# Patient Record
Sex: FEMALE | Race: WHITE | NOT HISPANIC OR LATINO | Employment: FULL TIME | URBAN - NONMETROPOLITAN AREA
[De-identification: names, ages, dates, MRNs, and addresses within clinical notes are randomized per-mention and may not be internally consistent; named-entity substitution may affect disease eponyms.]

---

## 2023-03-07 ENCOUNTER — OFFICE VISIT (OUTPATIENT)
Dept: CARDIOLOGY | Facility: CLINIC | Age: 33
End: 2023-03-07
Payer: MEDICAID

## 2023-03-07 ENCOUNTER — TELEPHONE (OUTPATIENT)
Dept: CARDIOLOGY | Facility: CLINIC | Age: 33
End: 2023-03-07
Payer: MEDICAID

## 2023-03-07 VITALS
HEART RATE: 91 BPM | DIASTOLIC BLOOD PRESSURE: 68 MMHG | HEIGHT: 64 IN | OXYGEN SATURATION: 98 % | SYSTOLIC BLOOD PRESSURE: 101 MMHG | WEIGHT: 142 LBS | BODY MASS INDEX: 24.24 KG/M2

## 2023-03-07 DIAGNOSIS — Z98.890 HISTORY OF LOOP RECORDER: Primary | ICD-10-CM

## 2023-03-07 DIAGNOSIS — R00.0 TACHYCARDIA: ICD-10-CM

## 2023-03-07 DIAGNOSIS — R00.2 PALPITATIONS: ICD-10-CM

## 2023-03-07 PROCEDURE — 1160F RVW MEDS BY RX/DR IN RCRD: CPT | Performed by: NURSE PRACTITIONER

## 2023-03-07 PROCEDURE — 1159F MED LIST DOCD IN RCRD: CPT | Performed by: NURSE PRACTITIONER

## 2023-03-07 PROCEDURE — 99204 OFFICE O/P NEW MOD 45 MIN: CPT | Performed by: NURSE PRACTITIONER

## 2023-03-07 RX ORDER — CETIRIZINE HYDROCHLORIDE 10 MG/1
10 TABLET ORAL DAILY
COMMUNITY

## 2023-03-07 RX ORDER — NEOMYCIN SULFATE, POLYMYXIN B SULFATE, AND DEXAMETHASONE 3.5; 10000; 1 MG/G; [USP'U]/G; MG/G
OINTMENT OPHTHALMIC 4 TIMES DAILY
COMMUNITY
Start: 2023-03-06

## 2023-03-07 RX ORDER — AZITHROMYCIN 250 MG/1
250 TABLET, FILM COATED ORAL DAILY
COMMUNITY
Start: 2023-03-06

## 2023-03-07 RX ORDER — CLONIDINE HYDROCHLORIDE 0.1 MG/1
0.1 TABLET ORAL 2 TIMES DAILY
COMMUNITY

## 2023-03-07 RX ORDER — DESVENLAFAXINE SUCCINATE 50 MG/1
50 TABLET, EXTENDED RELEASE ORAL DAILY
COMMUNITY

## 2023-03-07 NOTE — PROGRESS NOTES
Subjective     Ángela Turpin is a 33 y.o. female who presents to day for surgery clearance (Has a loop recorder has not see cardiologist since implant has had implant 2 yrs).    CHIEF COMPLIANT  Chief Complaint   Patient presents with   • surgery clearance     Has a loop recorder has not see cardiologist since implant has had implant 2 yrs       Active Problems:  Problem List Items Addressed This Visit    None  Visit Diagnoses     History of loop recorder    -  Primary    Palpitations        Tachycardia              HPI  HPI     ÁNGELA TURPIN is a 33-year-old female patient being seen today as a new patient for cardiac evaluation and risk stratification for surgery. She does have a loop recorder that has been implanted in the past. The patient's heart rate is 91 bpm and her blood pressure is 101/68 mmHg. EKG performed on 03/07/2023 showed incomplete right bundle branch block. The patient had an appendectomy in 12/2022.vy presents today for an initial visit to establish care and obtain cardiac evaluation and clearance for surgery.     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 The patient states she was supposed to wear a Holter monitor for 10 days, but day 5 she believes the stickers were burning her skin and notes Dr. Ayoub advised her if this was going to burn her skin, the next step would be to do a loop recorder.    She reports Dr. Renea Carreon's office called her and informed her she needed to be cleared for surgery. She states Dr. Ayoub has known about her having the loop recorder for 3 months and adds she was aggravated and decided to find a new cardiologist here. The patient states Dr. Ayoub informed her she would not have to do anything with the [facility] getting the readings. She states if there were any issues, they would contact her. The patient states she has not heard back from them  since. She states this is almost 3 years since 2020.    She reports she has had 2 MRIs since 2022 and states in 2022, she fractured her foot and had to have an MRI of her cervical spine and notes she may have had an MRI of her abdomen in 2022 due to appendicitis.  These MRIs occurred while having the loop recorder in place.  The patient reports she is able to walk up a flight of stairs every day, is able to do what she wants without any limitations and denies angina or dyspnea. She maintains she has 3 children and they keep her active.   She notes she does not have any cardiac history other than tachycardia, which seems to have resolved. The patient reports she has no family history of heart disease other than hypertension and hyperlipidemia and she has never smoked. The patient states her grandfather  of a myocardial infarction in his mid 60's and notes he did not have any issues prior to his myocardial infarction.    The patient states she has had issues with palpitations due to depression and anxiety but is not currently experiencing any.The patient states she has sleep apnea.    She denies chest pain, shortness of breath, lower extremity edema, palpitations, fatigue, dizziness, lightheadedness, syncope, PND, orthopnea, or strokelike symptoms.  PRIOR MEDS  Current Outpatient Medications on File Prior to Visit   Medication Sig Dispense Refill   • azithromycin (ZITHROMAX) 250 MG tablet Take 1 tablet by mouth Daily.     • cetirizine (zyrTEC) 10 MG tablet Take 1 tablet by mouth Daily.     • cloNIDine (CATAPRES) 0.1 MG tablet Take 1 tablet by mouth 2 (Two) Times a Day.     • desvenlafaxine (PRISTIQ) 50 MG 24 hr tablet Take 1 tablet by mouth Daily.     • neomycin-polymyxin-dexamethamethasone (POLYDEX) 3.5-54047-4.1 ointment ophthalmic ointment 4 (Four) Times a Day.       No current facility-administered medications on file prior to visit.       ALLERGIES  Vancomycin and Sulfa  "antibiotics    HISTORY  Past Medical History:   Diagnosis Date   • Herniated disc, cervical    • Spinal column deformity    • Tachycardia        Social History     Socioeconomic History   • Marital status:    Tobacco Use   • Smoking status: Never   • Smokeless tobacco: Never   Vaping Use   • Vaping Use: Never used   Substance and Sexual Activity   • Alcohol use: Never   • Drug use: Never   • Sexual activity: Defer       Family History   Problem Relation Age of Onset   • Hypertension Mother    • Diabetes Mother    • Cancer Mother    • Other Father         hypoglycemia   • Hyperlipidemia Father    • Hypertension Father    • Depression Brother    • Anxiety disorder Brother    • Thyroid disease Paternal Grandmother        Review of Systems   Constitutional: Negative for chills, fatigue and fever.   HENT: Negative for congestion, rhinorrhea and sore throat.    Eyes: Positive for visual disturbance (has had 24 surgeries on Left eye).   Respiratory: Positive for apnea (C PAP). Negative for chest tightness and shortness of breath.    Cardiovascular: Negative for chest pain, palpitations and leg swelling.   Gastrointestinal: Negative for constipation, diarrhea and nausea.   Musculoskeletal: Positive for back pain and neck pain. Negative for arthralgias.   Allergic/Immunologic: Positive for environmental allergies. Negative for food allergies.   Neurological: Negative for dizziness, syncope, weakness and light-headedness.   Hematological: Does not bruise/bleed easily.   Psychiatric/Behavioral: Negative for sleep disturbance.       Objective     VITALS: /68 (BP Location: Left arm, Patient Position: Sitting, Cuff Size: Adult)   Pulse 91   Ht 162.6 cm (64\")   Wt 64.4 kg (142 lb)   SpO2 98%   BMI 24.37 kg/m²     LABS:   Lab Results (most recent)     None          IMAGING:   No Images in the past 120 days found..    EXAM:  Physical Exam  Vitals and nursing note reviewed.   Constitutional:       Appearance: She " is well-developed.   HENT:      Head: Normocephalic.   Eyes:      Pupils: Pupils are equal, round, and reactive to light.   Neck:      Thyroid: No thyroid mass.      Vascular: No carotid bruit or JVD.      Trachea: Trachea and phonation normal.   Cardiovascular:      Rate and Rhythm: Normal rate and regular rhythm.      Pulses:           Radial pulses are 2+ on the right side and 2+ on the left side.        Posterior tibial pulses are 2+ on the right side and 2+ on the left side.      Heart sounds: Normal heart sounds. No murmur heard.    No friction rub. No gallop.   Pulmonary:      Effort: Pulmonary effort is normal. No respiratory distress.      Breath sounds: Normal breath sounds. No wheezing or rales.   Abdominal:      General: Bowel sounds are normal.      Palpations: Abdomen is soft.   Musculoskeletal:         General: No swelling. Normal range of motion.      Cervical back: Neck supple.   Skin:     General: Skin is warm and dry.      Capillary Refill: Capillary refill takes less than 2 seconds.      Findings: No rash.   Neurological:      Mental Status: She is alert and oriented to person, place, and time.   Psychiatric:         Speech: Speech normal.         Behavior: Behavior normal.         Thought Content: Thought content normal.         Judgment: Judgment normal.         Procedure     ECG 12 Lead    Date/Time: 3/10/2023 2:43 PM  Performed by: Werner Antony APRN  Authorized by: Werner Antony APRN   Comparison: not compared with previous ECG   Previous ECG: no previous ECG available  Rhythm: sinus rhythm  Rate: normal  BPM: 88  Conduction: incomplete right bundle branch block  QRS axis: normal  Comments: QTc 442 ms  No acute changes               Assessment & Plan           Diagnosis Plan   1. History of loop recorder        2. Palpitations        3. Tachycardia          1. The patient's EKG results were discussed in full detail with the patient.  She denies any angina anginal equivalent  symptoms.  2. The patient was advised to have her cervical spine surgery and have her loop recorder removed afterwards.    3. The patient's cardiac evaluation and surgical clearance will be faxed to Dr. Ayoub.  I do feel like patient would be at an acceptable risk due to the lack of anginal anginal equivalent symptoms.  She has no comorbidities that would put her at risk of heart disease or strong family history.  She seems to be a healthy 33-year-old female other than her cervical spine issues that are requiring surgery.  She also reports a reasonable functional capacity with the exception of the limitations that are brought on due to her cervical disc issues.  4.  Informed of signs and symptoms of ACS and advised to seek emergent treatment for any new worsening symptoms.  Patient also advised sooner follow-up as needed.  Also advised to follow-up with family doctor as needed  This note is dictated utilizing voice recognition software.  Although this record has been proof read, transcriptional errors may still be present. If questions occur regarding the content of this record please do not hesitate to call our office.  I have reviewed and confirmed the accuracy of the ROS as documented by the MA/LPN/RN YOVANA Chavez    Assessment  1. Preoperative examination  2. Cervical radiculopathy  3. Tachycardia    Return in about 6 months (around 9/7/2023), or if symptoms worsen or fail to improve.    Diagnoses and all orders for this visit:    1. History of loop recorder (Primary)    2. Palpitations    3. Tachycardia    Other orders  -     Cancel: ECG 12 Lead  -     ECG 12 Lead        Mckayla Turpin  reports that she has never smoked. She has never used smokeless tobacco.. I have educated her on the risk of diseases from using tobacco products .       MEDS ORDERED DURING VISIT:  No orders of the defined types were placed in this encounter.          This document has been electronically signed by Werner Antony  , APRN  March 10, 2023 14:44 EST     Transcribed from ambient dictation for YOVANA Chavez by Santiago Betts.  03/07/23   17:33 EST    Patient or patient representative verbalized consent to the visit recording.  I have personally performed the services described in this document as transcribed by the above individual, and it is both accurate and complete.

## 2023-03-07 NOTE — TELEPHONE ENCOUNTER
Received cardiac clearance request from  stating pt has C4-5, C5-6 anterior cervical discectomy and fusion scheduled for 03/15/2023 and is requiring a cardiac clearance. Placed cardiac clearance request in Kiarra's inbox to review and address with provider.

## 2023-03-10 ENCOUNTER — PATIENT ROUNDING (BHMG ONLY) (OUTPATIENT)
Dept: CARDIOLOGY | Facility: CLINIC | Age: 33
End: 2023-03-10
Payer: MEDICAID

## 2023-03-10 PROCEDURE — 93000 ELECTROCARDIOGRAM COMPLETE: CPT | Performed by: NURSE PRACTITIONER

## 2023-03-10 NOTE — PROGRESS NOTES
March 10, 2023    Hello, may I speak with Mckayla Turpin?    My name is LU ZMARIA RILEY      I am  with [facility]  [address] [place] [id number] [phone].    Before we get started may I verify your date of birth? 1990    I am calling to officially welcome you to our practice and ask about your recent visit. Is this a good time to talk? no    NO ANSWER.  UNABLE TO LVM      Thank you, and have a great day.

## 2023-10-03 ENCOUNTER — TELEPHONE (OUTPATIENT)
Dept: CARDIOLOGY | Facility: CLINIC | Age: 33
End: 2023-10-03
Payer: MEDICAID

## 2023-10-04 ENCOUNTER — TELEPHONE (OUTPATIENT)
Dept: CARDIOLOGY | Facility: CLINIC | Age: 33
End: 2023-10-04
Payer: MEDICAID

## 2023-11-03 ENCOUNTER — TELEPHONE (OUTPATIENT)
Dept: CARDIOLOGY | Facility: CLINIC | Age: 33
End: 2023-11-03
Payer: MEDICAID

## 2023-11-03 NOTE — LETTER
November 3, 2023     To Whom It May Concern:     We build our practice on integrity and patient care. The highest compliment we can receive is the referral of friends, family and patients to our office. We want to take this time to thank you for considering our group as part of your care team.     The medical records for Mckayla Turpin 1990 were reviewed for pre-operative clearance on 10/03/23. It has been determined that this patient has:  Acceptable Risk      Mckayla Turpin is currently on the following medications that will need to be held prior to surgery: No medication to hold at this time.     This pre-operative evaluation has been completed based on patient reported medical conditions at the date of this letter, this evaluation may have considered in part results of additional testing, completion of an EKG and patient reported symptoms at the time of their visit.     Mckayla Turpin's pre-operative clearance is good for thirty(30) days from the date of this letter with no reported changes in health, symptoms or diagnosis from the patient, their primary care provider or your office.     Your office has reported the patient will under go C5-6 ACDF on 11/07/23 with Dr. Ayoub. If this appointment is changed or moved outside of thirty(30) days from 11/03/23 this pre-operative clearance is void.     If you have any further questions please give our office a call.     Thank you for your trust,        YOVANA Chavez

## 2023-11-03 NOTE — TELEPHONE ENCOUNTER
Received cardiac clearance request from  stating pt has C5-6 ACDF scheduled for 11/07/2023 and is requiring a cardiac clearance. Placed cardiac clearance request in Clemencia's inbox to review and address with provider.

## 2023-12-28 ENCOUNTER — TELEPHONE (OUTPATIENT)
Dept: CARDIOLOGY | Facility: CLINIC | Age: 33
End: 2023-12-28
Payer: MEDICAID

## 2023-12-28 NOTE — TELEPHONE ENCOUNTER
Received cardiac clearance request from  stating pt has C5-6 ACDF scheduled for 01/09/2024 and is requiring a cardiac clearance. Placed cardiac clearance request in Kiarra's inbox to review and address with provider.

## 2024-08-16 ENCOUNTER — APPOINTMENT (OUTPATIENT)
Dept: CT IMAGING | Facility: HOSPITAL | Age: 34
End: 2024-08-16
Payer: MEDICAID

## 2024-08-16 ENCOUNTER — HOSPITAL ENCOUNTER (EMERGENCY)
Facility: HOSPITAL | Age: 34
Discharge: HOME | End: 2024-08-16
Attending: EMERGENCY MEDICINE
Payer: MEDICAID

## 2024-08-16 VITALS
SYSTOLIC BLOOD PRESSURE: 113 MMHG | BODY MASS INDEX: 30.39 KG/M2 | HEART RATE: 74 BPM | TEMPERATURE: 98.2 F | RESPIRATION RATE: 12 BRPM | WEIGHT: 178 LBS | DIASTOLIC BLOOD PRESSURE: 74 MMHG | OXYGEN SATURATION: 99 % | HEIGHT: 64 IN

## 2024-08-16 DIAGNOSIS — N30.30 FOLLICULAR CYSTITIS: Primary | ICD-10-CM

## 2024-08-16 LAB
ALBUMIN SERPL-MCNC: 4.3 G/DL (ref 3.5–5.2)
ALBUMIN/GLOB SERPL: 1.4 G/DL
ALP SERPL-CCNC: 68 U/L (ref 39–117)
ALT SERPL W P-5'-P-CCNC: 12 U/L (ref 1–33)
ANION GAP SERPL CALCULATED.3IONS-SCNC: 6.8 MMOL/L (ref 5–15)
AST SERPL-CCNC: 16 U/L (ref 1–32)
B-HCG UR QL: NEGATIVE
BASOPHILS # BLD AUTO: 0.06 10*3/MM3 (ref 0–0.2)
BASOPHILS NFR BLD AUTO: 0.7 % (ref 0–1.5)
BILIRUB SERPL-MCNC: 0.2 MG/DL (ref 0–1.2)
BILIRUB UR QL STRIP: NEGATIVE
BUN SERPL-MCNC: 7 MG/DL (ref 6–20)
BUN/CREAT SERPL: 10.4 (ref 7–25)
CALCIUM SPEC-SCNC: 9.2 MG/DL (ref 8.6–10.5)
CHLORIDE SERPL-SCNC: 102 MMOL/L (ref 98–107)
CLARITY UR: CLEAR
CO2 SERPL-SCNC: 28.2 MMOL/L (ref 22–29)
COLOR UR: YELLOW
CREAT SERPL-MCNC: 0.67 MG/DL (ref 0.57–1)
DEPRECATED RDW RBC AUTO: 39.3 FL (ref 37–54)
EGFRCR SERPLBLD CKD-EPI 2021: 117.1 ML/MIN/1.73
EOSINOPHIL # BLD AUTO: 0.48 10*3/MM3 (ref 0–0.4)
EOSINOPHIL NFR BLD AUTO: 5.5 % (ref 0.3–6.2)
ERYTHROCYTE [DISTWIDTH] IN BLOOD BY AUTOMATED COUNT: 11.9 % (ref 12.3–15.4)
GLOBULIN UR ELPH-MCNC: 3.1 GM/DL
GLUCOSE SERPL-MCNC: 99 MG/DL (ref 65–99)
GLUCOSE UR STRIP-MCNC: NEGATIVE MG/DL
HCT VFR BLD AUTO: 36.4 % (ref 34–46.6)
HGB BLD-MCNC: 12.5 G/DL (ref 12–15.9)
HGB UR QL STRIP.AUTO: NEGATIVE
IMM GRANULOCYTES # BLD AUTO: 0.02 10*3/MM3 (ref 0–0.05)
IMM GRANULOCYTES NFR BLD AUTO: 0.2 % (ref 0–0.5)
KETONES UR QL STRIP: NEGATIVE
LEUKOCYTE ESTERASE UR QL STRIP.AUTO: NEGATIVE
LIPASE SERPL-CCNC: 40 U/L (ref 13–60)
LYMPHOCYTES # BLD AUTO: 2.03 10*3/MM3 (ref 0.7–3.1)
LYMPHOCYTES NFR BLD AUTO: 23.3 % (ref 19.6–45.3)
MCH RBC QN AUTO: 31 PG (ref 26.6–33)
MCHC RBC AUTO-ENTMCNC: 34.3 G/DL (ref 31.5–35.7)
MCV RBC AUTO: 90.3 FL (ref 79–97)
MONOCYTES # BLD AUTO: 0.92 10*3/MM3 (ref 0.1–0.9)
MONOCYTES NFR BLD AUTO: 10.5 % (ref 5–12)
NEUTROPHILS NFR BLD AUTO: 5.22 10*3/MM3 (ref 1.7–7)
NEUTROPHILS NFR BLD AUTO: 59.8 % (ref 42.7–76)
NITRITE UR QL STRIP: NEGATIVE
NRBC BLD AUTO-RTO: 0 /100 WBC (ref 0–0.2)
PH UR STRIP.AUTO: 7.5 [PH] (ref 5–8)
PLATELET # BLD AUTO: 227 10*3/MM3 (ref 140–450)
PMV BLD AUTO: 9.5 FL (ref 6–12)
POTASSIUM SERPL-SCNC: 3.7 MMOL/L (ref 3.5–5.2)
PROT SERPL-MCNC: 7.4 G/DL (ref 6–8.5)
PROT UR QL STRIP: NEGATIVE
RBC # BLD AUTO: 4.03 10*6/MM3 (ref 3.77–5.28)
SODIUM SERPL-SCNC: 137 MMOL/L (ref 136–145)
SP GR UR STRIP: 1.01 (ref 1–1.03)
UROBILINOGEN UR QL STRIP: NORMAL
WBC NRBC COR # BLD AUTO: 8.73 10*3/MM3 (ref 3.4–10.8)

## 2024-08-16 PROCEDURE — 96374 THER/PROPH/DIAG INJ IV PUSH: CPT

## 2024-08-16 PROCEDURE — 81003 URINALYSIS AUTO W/O SCOPE: CPT | Performed by: NURSE PRACTITIONER

## 2024-08-16 PROCEDURE — 25010000002 MORPHINE PER 10 MG: Performed by: EMERGENCY MEDICINE

## 2024-08-16 PROCEDURE — 99285 EMERGENCY DEPT VISIT HI MDM: CPT

## 2024-08-16 PROCEDURE — 36415 COLL VENOUS BLD VENIPUNCTURE: CPT

## 2024-08-16 PROCEDURE — 85025 COMPLETE CBC W/AUTO DIFF WBC: CPT | Performed by: NURSE PRACTITIONER

## 2024-08-16 PROCEDURE — 25510000001 IOPAMIDOL 61 % SOLUTION: Performed by: EMERGENCY MEDICINE

## 2024-08-16 PROCEDURE — 96375 TX/PRO/DX INJ NEW DRUG ADDON: CPT

## 2024-08-16 PROCEDURE — 81025 URINE PREGNANCY TEST: CPT | Performed by: NURSE PRACTITIONER

## 2024-08-16 PROCEDURE — 83690 ASSAY OF LIPASE: CPT | Performed by: NURSE PRACTITIONER

## 2024-08-16 PROCEDURE — 25010000002 ONDANSETRON PER 1 MG: Performed by: EMERGENCY MEDICINE

## 2024-08-16 PROCEDURE — 80053 COMPREHEN METABOLIC PANEL: CPT | Performed by: NURSE PRACTITIONER

## 2024-08-16 PROCEDURE — 74177 CT ABD & PELVIS W/CONTRAST: CPT

## 2024-08-16 RX ORDER — HYDROCODONE BITARTRATE AND ACETAMINOPHEN 5; 325 MG/1; MG/1
1 TABLET ORAL EVERY 6 HOURS PRN
Qty: 3 TABLET | Refills: 0 | Status: SHIPPED | OUTPATIENT
Start: 2024-08-16 | End: 2024-08-17

## 2024-08-16 RX ORDER — ONDANSETRON 2 MG/ML
4 INJECTION INTRAMUSCULAR; INTRAVENOUS ONCE
Status: COMPLETED | OUTPATIENT
Start: 2024-08-16 | End: 2024-08-16

## 2024-08-16 RX ORDER — HYDROCODONE BITARTRATE AND ACETAMINOPHEN 5; 325 MG/1; MG/1
1 TABLET ORAL ONCE
Status: COMPLETED | OUTPATIENT
Start: 2024-08-16 | End: 2024-08-16

## 2024-08-16 RX ADMIN — IOPAMIDOL 100 ML: 612 INJECTION, SOLUTION INTRAVENOUS at 19:51

## 2024-08-16 RX ADMIN — HYDROCODONE BITARTRATE AND ACETAMINOPHEN 1 TABLET: 5; 325 TABLET ORAL at 21:57

## 2024-08-16 RX ADMIN — MORPHINE SULFATE 4 MG: 4 INJECTION, SOLUTION INTRAMUSCULAR; INTRAVENOUS at 20:59

## 2024-08-16 RX ADMIN — ONDANSETRON 4 MG: 2 INJECTION INTRAMUSCULAR; INTRAVENOUS at 20:59

## 2024-08-16 NOTE — Clinical Note
[facility] EMERGENCY DEPARTMENT  [address] [address]  Phone: [phone]    Mckayla Turpin was seen and treated in our emergency department on 8/16/2024.  She may return to work on 08/19/2024.         Thank you for choosing [facility].    Baltazar Hernandez, APRN

## 2024-08-17 NOTE — ED PROVIDER NOTES
Subjective:  History of Present Illness:    Patient is a 34-year-old female with history of cholecystectomy, appendectomy endometrial ablation and tubal.  Presents to the ER today for abdominal pain x 2 days.  Reports that abdominal pain is in the suprapubic region and midline.  She reports that she has been nauseous but has not had nausea or vomiting.  She denies changes in bowel habit.  Denies dysuria frequency or hematuria.  Denies OTC medication or home remedy.  Nuys leaving exacerbating factors.    Nurses Notes reviewed and agree, including vitals, allergies, social history and prior medical history.     REVIEW OF SYSTEMS: All systems reviewed and not pertinent unless noted.  Review of Systems   Gastrointestinal:  Positive for abdominal pain.   All other systems reviewed and are negative.      Past Medical History:   Diagnosis Date    Herniated disc, cervical     Spinal column deformity     Tachycardia        Allergies:    Vancomycin and Sulfa antibiotics      Past Surgical History:   Procedure Laterality Date    APPENDECTOMY  12/2022    CHOLECYSTECTOMY  2017    COLONOSCOPY      ENDOMETRIAL ABLATION      EYE SURGERY Left     x 24    TONSILECTOMY, ADENOIDECTOMY, BILATERAL MYRINGOTOMY AND TUBES      TUBAL ABDOMINAL LIGATION  2014         Social History     Socioeconomic History    Marital status:    Tobacco Use    Smoking status: Never    Smokeless tobacco: Never   Vaping Use    Vaping status: Never Used   Substance and Sexual Activity    Alcohol use: Never    Drug use: Never    Sexual activity: Defer         Family History   Problem Relation Age of Onset    Hypertension Mother     Diabetes Mother     Cancer Mother     Other Father         hypoglycemia    Hyperlipidemia Father     Hypertension Father     Depression Brother     Anxiety disorder Brother     Thyroid disease Paternal Grandmother        Objective  Physical Exam:  /74   Pulse 74   Temp 98.2 °F (36.8 °C) (Oral)   Resp 12   Ht 162.6 cm  "(64\")   Wt 80.7 kg (178 lb)   SpO2 99%   BMI 30.55 kg/m²      Physical Exam  Vitals and nursing note reviewed.   Constitutional:       Appearance: She is well-developed and normal weight.   HENT:      Head: Normocephalic and atraumatic.      Mouth/Throat:      Mouth: Mucous membranes are moist.      Pharynx: Oropharynx is clear.   Eyes:      Extraocular Movements: Extraocular movements intact.      Pupils: Pupils are equal, round, and reactive to light.   Cardiovascular:      Rate and Rhythm: Normal rate and regular rhythm.      Heart sounds: Normal heart sounds.   Pulmonary:      Effort: Pulmonary effort is normal.      Breath sounds: Normal breath sounds.   Abdominal:      General: Bowel sounds are normal.      Palpations: Abdomen is soft.      Tenderness:  in the suprapubic area   Skin:     General: Skin is warm and dry.      Capillary Refill: Capillary refill takes less than 2 seconds.   Neurological:      General: No focal deficit present.      Mental Status: She is alert and oriented to person, place, and time.   Psychiatric:         Mood and Affect: Mood normal.         Behavior: Behavior normal.         Procedures    ED Course:    ED Course as of 08/16/24 2155   Fri Aug 16, 2024   2135 Patient generalized abdominal pain particularly suprapubic.  Patient with grossly negative labs.  Pending CT imaging evaluation.  If CT is negative do feel patient will be stable for discharge. [CR]      ED Course User Index  [CR] Ozzie Guzmán DO       Lab Results (last 24 hours)       Procedure Component Value Units Date/Time    CBC Auto Differential [[id number]]  (Abnormal) Collected: 08/16/24 1900    Specimen: Blood Updated: 08/16/24 1907     WBC 8.73 10*3/mm3      RBC 4.03 10*6/mm3      Hemoglobin 12.5 g/dL      Hematocrit 36.4 %      MCV 90.3 fL      MCH 31.0 pg      MCHC 34.3 g/dL      RDW 11.9 %      RDW-SD 39.3 fl      MPV 9.5 fL      Platelets 227 10*3/mm3      Neutrophil % 59.8 %      Lymphocyte % " 23.3 %      Monocyte % 10.5 %      Eosinophil % 5.5 %      Basophil % 0.7 %      Immature Grans % 0.2 %      Neutrophils, Absolute 5.22 10*3/mm3      Lymphocytes, Absolute 2.03 10*3/mm3      Monocytes, Absolute 0.92 10*3/mm3      Eosinophils, Absolute 0.48 10*3/mm3      Basophils, Absolute 0.06 10*3/mm3      Immature Grans, Absolute 0.02 10*3/mm3      nRBC 0.0 /100 WBC     Comprehensive Metabolic Panel [[id number]] Collected: 08/16/24 1900    Specimen: Blood Updated: 08/16/24 1950     Glucose 99 mg/dL      BUN 7 mg/dL      Creatinine 0.67 mg/dL      Sodium 137 mmol/L      Potassium 3.7 mmol/L      Chloride 102 mmol/L      CO2 28.2 mmol/L      Calcium 9.2 mg/dL      Total Protein 7.4 g/dL      Albumin 4.3 g/dL      ALT (SGPT) 12 U/L      AST (SGOT) 16 U/L      Alkaline Phosphatase 68 U/L      Total Bilirubin 0.2 mg/dL      Globulin 3.1 gm/dL      A/G Ratio 1.4 g/dL      BUN/Creatinine Ratio 10.4     Anion Gap 6.8 mmol/L      eGFR 117.1 mL/min/1.73     Narrative:      GFR Normal >60  Chronic Kidney Disease <60  Kidney Failure <15      Lipase [[id number]]  (Normal) Collected: 08/16/24 1900    Specimen: Blood Updated: 08/16/24 1950     Lipase 40 U/L     Pregnancy, Urine - Urine, Clean Catch [[id number]]  (Normal) Collected: 08/16/24 1915    Specimen: Urine, Clean Catch Updated: 08/16/24 1922     HCG, Urine QL Negative    Urinalysis With Culture If Indicated - Urine, Clean Catch [[id number]]  (Normal) Collected: 08/16/24 1915    Specimen: Urine, Clean Catch Updated: 08/16/24 2033     Color, UA Yellow     Appearance, UA Clear     pH, UA 7.5     Specific Gravity, UA 1.011     Glucose, UA Negative     Ketones, UA Negative     Bilirubin, UA Negative     Blood, UA Negative     Protein, UA Negative     Leuk Esterase, UA Negative     Nitrite, UA Negative     Urobilinogen, UA 1.0 E.U./dL    Narrative:      In absence of clinical symptoms, the presence of pyuria, bacteria, and/or nitrites on the urinalysis result does not correlate  with infection.  Urine microscopic not indicated.             CT Abdomen Pelvis With Contrast    Result Date: 8/16/2024  FINAL REPORT TECHNIQUE: null CLINICAL HISTORY: Lower abdominal pain COMPARISON: null FINDINGS: CT abdomen and pelvis with contrast Comparison: None Findings: The lung bases are clear. A small umbilical hernia which contains The abdominal aorta is unremarkable. The liver is unremarkable. Cholecystectomy. The pancreas, spleen, and adrenal glands are all unremarkable. Tiny cortical cysts of the left kidney. No nephrolithiasis or hydronephrosis. The contour of the urinary bladder is unremarkable. Anteverted uterus. Trace free fluid in the cul-de-sac and within the right adnexa which is likely physiologic or reactive. A small follicular cyst of the right adnexa measures 1.0 cm. Mild intraluminal fluid within the small bowel No bowel obstruction, pneumoperitoneum, or pneumatosis. Mild colonic fecal burden. Appendectomy. The bones are intact.     Impression: IMPRESSION: 1. Trace free fluid in the cul-de-sac and within the right adnexa which is likely physiologic or reactive. A small follicular cyst of the right adnexa measures 1.0 cm. 2. Cholecystectomy and appendectomy. Authenticated and Electronically Signed by Tahir Meng DO on 08/16/2024 09:40:06 PM        MDM      Initial impression of presenting illness:   Patient is a 34-year-old female with history of cholecystectomy, appendectomy endometrial ablation and tubal.  Presents to the ER today for abdominal pain x 2 days.  Reports that abdominal pain is in the suprapubic region and midline.  She reports that she has been nauseous but has not had nausea or vomiting.  She denies changes in bowel habit.  Denies dysuria frequency or hematuria.  Denies OTC medication or home remedy.  Nuys leaving exacerbating factors.    DDX: includes but is not limited to: Ovarian cyst, acute cystitis, UTI or other    Patient arrives stable with vitals interpreted by  myself.     Pertinent features from physical exam: Lung sounds are clear bilaterally throughout.  Abdomen is soft.  There is tenderness midline suprapubic region..    Initial diagnostic plan: CBC, CMP, lipase, urinalysis, urine pregnancy, CT abdomen pelvis    Results from initial plan were reviewed and interpreted by me revealing CBC, CMP, lipase, urinalysis, urine Prag were all negative.  CT abdomen pelvis with the following impression trace free fluid in the cul-de-sac and within the right adnexa which is actually physiologic or reactive.  A small follicular cyst of the right adnexa measures 1.0 cm    Diagnostic information from other sources: Chart review    Interventions / Re-evaluation: Signs stable throughout encounter    Results/clinical rationale were discussed with patient    Consultations/Discussion of results with other physicians: N/A    Disposition plan: Patient is hemodynamic stable nontoxic-appearing appropriate discharge.  Will have patient follow-up with OB/GYN.  Follow-up ER for any worse symptoms.  -----        Final diagnoses:   Follicular cystitis          Baltazar Hernandez, APRN  08/16/24 [id number]

## 2024-08-19 ENCOUNTER — APPOINTMENT (OUTPATIENT)
Dept: ULTRASOUND IMAGING | Facility: HOSPITAL | Age: 34
End: 2024-08-19
Payer: MEDICAID

## 2024-08-19 ENCOUNTER — HOSPITAL ENCOUNTER (EMERGENCY)
Facility: HOSPITAL | Age: 34
Discharge: HOME | End: 2024-08-19
Attending: EMERGENCY MEDICINE | Admitting: EMERGENCY MEDICINE
Payer: MEDICAID

## 2024-08-19 VITALS
SYSTOLIC BLOOD PRESSURE: 117 MMHG | WEIGHT: 180 LBS | HEIGHT: 64 IN | DIASTOLIC BLOOD PRESSURE: 73 MMHG | RESPIRATION RATE: 20 BRPM | BODY MASS INDEX: 30.73 KG/M2 | HEART RATE: 81 BPM | OXYGEN SATURATION: 99 % | TEMPERATURE: 98.3 F

## 2024-08-19 DIAGNOSIS — R10.30 LOWER ABDOMINAL PAIN: Primary | ICD-10-CM

## 2024-08-19 LAB
ALBUMIN SERPL-MCNC: 4.3 G/DL (ref 3.5–5.2)
ALBUMIN/GLOB SERPL: 1.4 G/DL
ALP SERPL-CCNC: 73 U/L (ref 39–117)
ALT SERPL W P-5'-P-CCNC: 15 U/L (ref 1–33)
ANION GAP SERPL CALCULATED.3IONS-SCNC: 7.9 MMOL/L (ref 5–15)
AST SERPL-CCNC: 20 U/L (ref 1–32)
BASOPHILS # BLD AUTO: 0.05 10*3/MM3 (ref 0–0.2)
BASOPHILS NFR BLD AUTO: 0.7 % (ref 0–1.5)
BILIRUB SERPL-MCNC: 0.3 MG/DL (ref 0–1.2)
BILIRUB UR QL STRIP: NEGATIVE
BUN SERPL-MCNC: 5 MG/DL (ref 6–20)
BUN/CREAT SERPL: 8.9 (ref 7–25)
CALCIUM SPEC-SCNC: 8.8 MG/DL (ref 8.6–10.5)
CHLORIDE SERPL-SCNC: 104 MMOL/L (ref 98–107)
CLARITY UR: CLEAR
CO2 SERPL-SCNC: 26.1 MMOL/L (ref 22–29)
COLOR UR: YELLOW
CREAT SERPL-MCNC: 0.56 MG/DL (ref 0.57–1)
DEPRECATED RDW RBC AUTO: 40.9 FL (ref 37–54)
EGFRCR SERPLBLD CKD-EPI 2021: 122.2 ML/MIN/1.73
EOSINOPHIL # BLD AUTO: 0.53 10*3/MM3 (ref 0–0.4)
EOSINOPHIL NFR BLD AUTO: 7.1 % (ref 0.3–6.2)
ERYTHROCYTE [DISTWIDTH] IN BLOOD BY AUTOMATED COUNT: 12 % (ref 12.3–15.4)
GLOBULIN UR ELPH-MCNC: 3 GM/DL
GLUCOSE SERPL-MCNC: 94 MG/DL (ref 65–99)
GLUCOSE UR STRIP-MCNC: NEGATIVE MG/DL
HCT VFR BLD AUTO: 38.4 % (ref 34–46.6)
HGB BLD-MCNC: 12.9 G/DL (ref 12–15.9)
HGB UR QL STRIP.AUTO: NEGATIVE
HOLD SPECIMEN: NORMAL
HOLD SPECIMEN: NORMAL
IMM GRANULOCYTES # BLD AUTO: 0.01 10*3/MM3 (ref 0–0.05)
IMM GRANULOCYTES NFR BLD AUTO: 0.1 % (ref 0–0.5)
KETONES UR QL STRIP: NEGATIVE
LEUKOCYTE ESTERASE UR QL STRIP.AUTO: NEGATIVE
LIPASE SERPL-CCNC: 33 U/L (ref 13–60)
LYMPHOCYTES # BLD AUTO: 1.78 10*3/MM3 (ref 0.7–3.1)
LYMPHOCYTES NFR BLD AUTO: 23.9 % (ref 19.6–45.3)
MCH RBC QN AUTO: 31.2 PG (ref 26.6–33)
MCHC RBC AUTO-ENTMCNC: 33.6 G/DL (ref 31.5–35.7)
MCV RBC AUTO: 92.8 FL (ref 79–97)
MONOCYTES # BLD AUTO: 0.73 10*3/MM3 (ref 0.1–0.9)
MONOCYTES NFR BLD AUTO: 9.8 % (ref 5–12)
NEUTROPHILS NFR BLD AUTO: 4.36 10*3/MM3 (ref 1.7–7)
NEUTROPHILS NFR BLD AUTO: 58.4 % (ref 42.7–76)
NITRITE UR QL STRIP: NEGATIVE
NRBC BLD AUTO-RTO: 0 /100 WBC (ref 0–0.2)
PH UR STRIP.AUTO: 6 [PH] (ref 5–8)
PLATELET # BLD AUTO: 260 10*3/MM3 (ref 140–450)
PMV BLD AUTO: 9.9 FL (ref 6–12)
POTASSIUM SERPL-SCNC: 4 MMOL/L (ref 3.5–5.2)
PROT SERPL-MCNC: 7.3 G/DL (ref 6–8.5)
PROT UR QL STRIP: NEGATIVE
RBC # BLD AUTO: 4.14 10*6/MM3 (ref 3.77–5.28)
SODIUM SERPL-SCNC: 138 MMOL/L (ref 136–145)
SP GR UR STRIP: <=1.005 (ref 1–1.03)
UROBILINOGEN UR QL STRIP: NORMAL
WBC NRBC COR # BLD AUTO: 7.46 10*3/MM3 (ref 3.4–10.8)
WHOLE BLOOD HOLD COAG: NORMAL
WHOLE BLOOD HOLD SPECIMEN: NORMAL

## 2024-08-19 PROCEDURE — 96372 THER/PROPH/DIAG INJ SC/IM: CPT

## 2024-08-19 PROCEDURE — 80053 COMPREHEN METABOLIC PANEL: CPT | Performed by: EMERGENCY MEDICINE

## 2024-08-19 PROCEDURE — 85025 COMPLETE CBC W/AUTO DIFF WBC: CPT | Performed by: EMERGENCY MEDICINE

## 2024-08-19 PROCEDURE — 99284 EMERGENCY DEPT VISIT MOD MDM: CPT

## 2024-08-19 PROCEDURE — 76830 TRANSVAGINAL US NON-OB: CPT

## 2024-08-19 PROCEDURE — 25010000002 DICYCLOMINE PER 20 MG: Performed by: EMERGENCY MEDICINE

## 2024-08-19 PROCEDURE — 81003 URINALYSIS AUTO W/O SCOPE: CPT | Performed by: EMERGENCY MEDICINE

## 2024-08-19 PROCEDURE — 83690 ASSAY OF LIPASE: CPT | Performed by: EMERGENCY MEDICINE

## 2024-08-19 PROCEDURE — 25010000002 MORPHINE PER 10 MG: Performed by: EMERGENCY MEDICINE

## 2024-08-19 PROCEDURE — 36415 COLL VENOUS BLD VENIPUNCTURE: CPT

## 2024-08-19 PROCEDURE — 25810000003 SODIUM CHLORIDE 0.9 % SOLUTION: Performed by: EMERGENCY MEDICINE

## 2024-08-19 PROCEDURE — 96374 THER/PROPH/DIAG INJ IV PUSH: CPT

## 2024-08-19 RX ORDER — DICYCLOMINE HYDROCHLORIDE 10 MG/ML
20 INJECTION INTRAMUSCULAR ONCE
Status: COMPLETED | OUTPATIENT
Start: 2024-08-19 | End: 2024-08-19

## 2024-08-19 RX ORDER — SODIUM CHLORIDE 0.9 % (FLUSH) 0.9 %
10 SYRINGE (ML) INJECTION AS NEEDED
Status: DISCONTINUED | OUTPATIENT
Start: 2024-08-19 | End: 2024-08-20 | Stop reason: HOSPADM

## 2024-08-19 RX ORDER — PREDNISONE 20 MG/1
20 TABLET ORAL DAILY
Qty: 3 TABLET | Refills: 0 | Status: SHIPPED | OUTPATIENT
Start: 2024-08-19 | End: 2024-08-22

## 2024-08-19 RX ORDER — DICYCLOMINE HYDROCHLORIDE 10 MG/1
10 CAPSULE ORAL 3 TIMES DAILY PRN
Qty: 20 CAPSULE | Refills: 0 | Status: SHIPPED | OUTPATIENT
Start: 2024-08-19

## 2024-08-19 RX ADMIN — SODIUM CHLORIDE 1000 ML: 9 INJECTION, SOLUTION INTRAVENOUS at 17:47

## 2024-08-19 RX ADMIN — DICYCLOMINE HYDROCHLORIDE 20 MG: 10 INJECTION, SOLUTION INTRAMUSCULAR at 17:47

## 2024-08-19 RX ADMIN — MORPHINE SULFATE 4 MG: 4 INJECTION, SOLUTION INTRAMUSCULAR; INTRAVENOUS at 21:05

## 2024-08-19 NOTE — ED PROVIDER NOTES
March 18, 2021    Vadim Teixeira   [address] [facility] [address]    EGD Instructions        Efrain Marquez MD  [phone]        COVID TEST INSTRUCTIONS   Date of COVID testing  Bring ID   Date: Friday, April 16, 2021  Time:  3:25pm   Location: [facility],[address] [facility], [facility]  [id number]    Once you leave the testing area, it's important that you go straight home and remain at home until the day of your procedure to reduce your risk of any new exposure. It is very important to avoid any potential exposure to COVID-19.  Please follow these  instructions until surgery or procedure has been completed.    ·  Do NOT travel out of home      ·  Immediately report any new symptoms or suspected/known exposures to COVID-19 cases to your surgeon office.    · Maintain physical distancing (at least 6 feet) at all times     · Wash hands frequently and thoroughly with soap and water (lather at least 20 seconds) or disinfect with alcohol-based hand  before eating, before touching face/mouth/eyes, after touching shared objects or high touch surfaces.     · Regularly clean cell phones, door handles, light switches, faucet and toilet handles, bathrooms, and kitchen surfaces using household disinfectant or hot soapy water.                                Date of Procedure: April 19, 2021  Arrive to register at: 9:30am  Location:  [facility] - Day Surgery  [address] [place] [address]  [phone]  Please enter through the main doors near the Physician's [place] and check in at Day Surgery Registration.       Pre-Admit :  [place] [phone]     Please read the entire instructions as soon as you receive them    Patient Checklist    7 Days prior to your procedure  · If possible, try to avoid non-steroidal anti-inflammatory drugs (ibuprofen, Advil, Motrin, etc.).   · If you are on Plavix, please follow the instructions discussed       [facility] EMERGENCY DEPARTMENT  Emergency Department Encounter  Emergency Medicine Physician Note     Pt Name:Mckayla Turpin  MRN: [id number]  Birthdate 1990  Date of evaluation: 8/19/2024  PCP:  Briana White APRN  Note Started: 5:07 PM EDT      CHIEF COMPLAINT       Chief Complaint   Patient presents with    Abdominal Pain       HISTORY OF PRESENT ILLNESS  (Location/Symptom, Timing/Onset, Context/Setting, Quality, Duration, Modifying Factors, Severity.)      Mckayla Turpin is a 34 y.o. female who presents with suprapubic abdominal pain.  Patient states that she has been going on for about a week.  Patient was evaluated on Friday, states due to weakness she is having no relief, patient states that she does have increased food aversion.  Patient denies any fevers or chills.  Patient denies any change in urination.  Patient does state that she has not had a stool since Friday.  Patient normally does not have constipation.    PAST MEDICAL / SURGICAL / SOCIAL / FAMILY HISTORY     Past Medical History:   Diagnosis Date    Herniated disc, cervical     Spinal column deformity     Tachycardia      No additional pertinent       Past Surgical History:   Procedure Laterality Date    APPENDECTOMY  12/2022    CHOLECYSTECTOMY  2017    COLONOSCOPY      ENDOMETRIAL ABLATION      EYE SURGERY Left     x 24    TONSILECTOMY, ADENOIDECTOMY, BILATERAL MYRINGOTOMY AND TUBES      TUBAL ABDOMINAL LIGATION  2014     No additional pertinent       Social History     Socioeconomic History    Marital status:    Tobacco Use    Smoking status: Never    Smokeless tobacco: Never   Vaping Use    Vaping status: Never Used   Substance and Sexual Activity    Alcohol use: Never    Drug use: Never    Sexual activity: Defer       Family History   Problem Relation Age of Onset    Hypertension Mother     Diabetes Mother     Cancer Mother     Other Father         hypoglycemia    Hyperlipidemia Father     Hypertension Father   "   Depression Brother     Anxiety disorder Brother     Thyroid disease Paternal Grandmother        Allergies:  Vancomycin and Sulfa antibiotics    Home Medications:  Prior to Admission medications    Medication Sig Start Date End Date Taking? Authorizing Provider   cetirizine (zyrTEC) 10 MG tablet Take 1 tablet by mouth Daily.    ProviderCandelaria MD   ciprofloxacin-dexAMETHasone (CIPRODEX) 0.3-0.1 % otic suspension Administer 4 drops into the left ear 2 (Two) Times a Day. 7/8/24   Karin Mooney APRN   cloNIDine (CATAPRES) 0.1 MG tablet Take 1 tablet by mouth 2 (Two) Times a Day.    ProviderCandelaria MD   desvenlafaxine (PRISTIQ) 50 MG 24 hr tablet Take 1 tablet by mouth Daily.    ProviderCandelaria MD         REVIEW OF SYSTEMS       Review of Systems   Constitutional:  Negative for diaphoresis and fever.   Respiratory:  Negative for chest tightness and shortness of breath.    Cardiovascular:  Negative for chest pain.   Gastrointestinal:  Positive for abdominal pain and constipation. Negative for nausea and vomiting.   Endocrine: Negative for polyuria.   Genitourinary:  Negative for difficulty urinating and frequency.       PHYSICAL EXAM      INITIAL VITALS:   /73 (BP Location: Left arm, Patient Position: Sitting)   Pulse 81   Temp 98.3 °F (36.8 °C) (Oral)   Resp 20   Ht 162.6 cm (64\")   Wt 81.6 kg (180 lb)   SpO2 99%   BMI 30.90 kg/m²     Physical Exam  Constitutional:       Appearance: Normal appearance.   HENT:      Head: Normocephalic and atraumatic.      Mouth/Throat:      Mouth: Mucous membranes are moist.      Pharynx: Oropharynx is clear.   Cardiovascular:      Rate and Rhythm: Normal rate and regular rhythm.      Pulses: Normal pulses.   Pulmonary:      Effort: Pulmonary effort is normal.      Breath sounds: Normal breath sounds.   Abdominal:      General: Abdomen is flat. There is no distension.      Palpations: Abdomen is soft.      Tenderness: There is abdominal tenderness in the " at your clinic visit. If any questions call the office for more information.       5 Days prior to your procedure  · If you are on Coumadin, hold it as instructed at your visit. If any questions call the office for more information.  · Confirm you have a  to take you home following the procedure. You cannot take a taxi cab, bus, or public transport home alone.     1 Day before your procedure  · Nothing to eat or drink after midnight.  · No alcohol    Day of the procedure  · No solid food. No alcohol.  · You can take your medications with a sip of water up to 3 hours from your scheduled procedure, except for diabetic medications or blood thinners as previously instructed.   · You should not drink, chew gum or take anything by mouth 3 hours prior to your procedure.    If you have any questions about the process, please call the office.    Special instructions    Diabetics - Contact your physician or my office for instructions on your diabetic medications, including insulin. In general, hold your diabetic medications the day of the procedure.     Blood Thinners - Including dabigatran (Pradaxa), clopidogril (Plavix), warfarin (Coumadin), dipyridamole ( Persantine), rivaroxaban (Xarelto). Please contact the prescribing provider to get clearance to hold the following blood thinner(s):    None    Frequently asked questions    What is an EGD?   An EGD is a procedure where we can examine your upper GI tract (esophagus, stomach, and a short segment of your small intestine) for abnormalities. A flexible tube that has a camera and light at the tip and as thick as your pinkie is used in the exam.       Can I take all my medications?  Most medications can be continued without problems. However please contact my office if you take diabetic medications, blood thinners such as Plavix, Coumadin, warfarin, aspirin, arthritis medications, non-steroidal anti-inflammatories (ibuprofen, Motrin etc.), or iron supplements.    What can  I expect the day of the procedure?  You will arrive at the facility where you are scheduled. We have you arrive early since you will need to fill out your information. An IV will be placed so we can give you medications. Once in the procedure room, I will talk to you and answer any questions you may have. The back of your throat will be numbed with a solution. Sedation will then be started to help you relax. This is might be \" monitored anesthesia care\" or  \"conscious sedation\".     You will usually lie on your left side. I will ask you to take a big swallow to help guide the scope into the esophagus. I will then advance the scope into your esophagus, stomach and short portion of your small intestine. During the procedure it is normal during the procedure to feel some pressure. Then entire procedure takes approximately 5 minutes, however this can be prolonged in complicated cases.    Once the procedure is complete, you will be brought to the recovery room until you are stable to leave. You should plan on being at the procedure site for 2 to 3 hours.     Will I be completely sedated for the procedure?  In most cases you will be receiving conscious sedation, meaning that you will be in a “twilight sleep”. You will breathe on your own and will be able to follow commands.     The procedure usually is not painful, however you may have discomfort with having the scope in the back of your throat.  The goal of the sedation is to keep you as comfortable as possible.     Patients that have a history of taking chronic medications such as pain medication, anti-anxiety medications or alcohol use may build up a tolerance to the sedation. This may make it difficult or impossible to fully sedate you for the procedure.     This procedure can usually also be performed without sedation, since the procedure is not painful and is short in duration.     What if something abnormal is found during the EGD?  Biopsies, tissue samples, can be  right lower quadrant, suprapubic area and left lower quadrant. There is no guarding.   Musculoskeletal:         General: Normal range of motion.   Skin:     General: Skin is warm and dry.   Neurological:      General: No focal deficit present.      Mental Status: She is alert and oriented to person, place, and time.   Psychiatric:         Mood and Affect: Mood normal.         Behavior: Behavior normal.           DDX/DIAGNOSTIC RESULTS / EMERGENCY DEPARTMENT COURSE / MDM     Differential Diagnosis included but not limited: TOA, Ovarian torsion, acute cystitis, diverticulitis lower concern with no WBC and recent negative CT.  Irritable bowel syndrome.  Inflammatory bowel syndrome.      Diagnoses Considered but Do Not Suspect:  Pregancy with hx of ablation.  Mesenteric ischemia.     Decision Rules/Scores utilized: N/A     Tests considered but not ordered and why:  N/A     MIPS: N/A     Code Status Discussion:  Not Discussed    Additional Patient Education Provided: None     Medical Decision Making    Medical Decision Making  Patient presents with lower abdominal pain/pelvic pain. Abdominal exam without peritoneal signs. No evidence of acute abdomen at this time. Well appearing.  Patient denying any vaginal bleeding or discharge, low concern for PID or TOA, pt declining swabs, describes monogamous relationship.  Pregnancy not likely with ablation, negative 3 days ago.  considered ovarian torsion negative ultrasound. Given work up low suspicion for acute hepatobiliary disease (including acute cholecystitis), acute pancreatitis (neg lipase), PUD and gastric perforation, acute infectious processes (pneumonia, hepatitis, pyelonephritis), acute appendicitis, vascular catastrophe, bowel obstruction or viscus perforation, diverticulitis. Presentation not consistent with other acute, emergent causes of abdominal pain at this time.  With grossly negative workup do feel patient is stable for discharge home.  Patient instructed to  obtained during the exam.     What will happen after the procedure?  I will discuss the findings with you. If tissue samples/polyps are removed, my office will send you a letter in 3-4 weeks time. If you do not hear from me in 4 weeks, you can call my office for results.     Even if you feel alert after the procedure, your judgment and reflexes may be impaired the rest of the day. You should not drive, work machinery or perform any other task that requires your full attention.    It is common to have a sore throat after the procedure. This usually will resolve after a couple of days. To help, you can take Tylenol or throat lozenges.    Normally you may resume a regular diet after the procedure is completed. If you are on a blood thinner, this may be held if large biopsies are obtained.    Why do I need some one to accompany me to the exam?  Because you are given a sedative, you need someone you know to drive you home after the exam. If you are taking a bus, taxi or van service a responsible adult you know must accompany you. If this is a problem, the EGD can be attempted without any sedation.     What are the complications of the exam?  EGDs are relatively safe, however complications can occur.  Some but not all of the risk are discussed below. Some patients may have an adverse reaction with the sedation or complications from heart and lung disease. There is also the risk of causing bleeding and perforation (poking a hole in the GI tract). If these complications do occur, they may need surgical treatment. Also all of the walls cannot be fully visualized, and lesions can be missed.     After the procedure, if you have any abdominal pain, fever, chills, nausea, vomiting, black or bright red blood in your stools it is important to notify me or seek immediate medical attention.      Information above was obtained and revised from the American Society for [employer] web site.  return for any worsening abdominal pain, vaginal bleeding or discharge, or any other concerns.     Problems Addressed:  Lower abdominal pain: complicated acute illness or injury    Amount and/or Complexity of Data Reviewed  External Data Reviewed: labs and notes.     Details: From visit 3 days prior.   Labs: ordered.  Radiology: ordered.    Risk  Prescription drug management.        See ED COURSE for additional MDM statements    EKG  None Performed     All EKG's are interpreted by the Emergency Department Physician who either signs or Co-signs this chart in the absence of a cardiologist.    Additional Scores                   EMERGENCY DEPARTMENT COURSE:         PROCEDURES:  None Performed   Procedures    DATA FOR LAB AND RADIOLOGY TESTS ORDERED BELOW ARE REVIEWED BY THE ED CLINICIAN:    RADIOLOGY: All x-rays, CT, MRI, and formal ultrasound images (except ED bedside ultrasound) are read by the radiologist, see reports below, unless otherwise noted in MDM or here.  Reports below are reviewed by myself.  US Non-ob Transvaginal   Final Result   Impression:      Mild nonspecific fluid within the endometrium      Authenticated and Electronically Signed by Tahir Welsh MD on   08/19/2024 08:19:06 PM          LABS: Lab orders shown below, the results are reviewed by myself, and all abnormals are listed below.  Labs Reviewed   COMPREHENSIVE METABOLIC PANEL - Abnormal; Notable for the following components:       Result Value    BUN 5 (*)     Creatinine 0.56 (*)     All other components within normal limits    Narrative:     GFR Normal >60  Chronic Kidney Disease <60  Kidney Failure <15     CBC WITH AUTO DIFFERENTIAL - Abnormal; Notable for the following components:    RDW 12.0 (*)     Eosinophil % 7.1 (*)     Eosinophils, Absolute 0.53 (*)     All other components within normal limits   LIPASE - Normal   URINALYSIS W/ MICROSCOPIC IF INDICATED (NO CULTURE) - Normal    Narrative:     Urine microscopic not indicated.  "  RAINBOW DRAW    Narrative:     The following orders were created for panel order [place] Draw.  Procedure                               Abnormality         Status                     ---------                               -----------         ------                     Green Top (Gel)[[id number]]                                  Final result               Lavender Top[[id number]]                                     Final result               Gold Top - SST[[id number]]                                   Final result               Light Blue Top[[id number]]                                   Final result                 Please view results for these tests on the individual orders.   CBC AND DIFFERENTIAL    Narrative:     The following orders were created for panel order CBC & Differential.  Procedure                               Abnormality         Status                     ---------                               -----------         ------                     CBC Auto Differential[[id number]]        Abnormal            Final result                 Please view results for these tests on the individual orders.   GREEN TOP   LAVENDER TOP   GOLD TOP - SST   LIGHT BLUE TOP       Vitals Reviewed:    Vitals:    08/19/24 1637   BP: 117/73   BP Location: Left arm   Patient Position: Sitting   Pulse: 81   Resp: 20   Temp: 98.3 °F (36.8 °C)   TempSrc: Oral   SpO2: 99%   Weight: 81.6 kg (180 lb)   Height: 162.6 cm (64\")       MEDICATIONS GIVEN TO PATIENT THIS ENCOUNTER:  Medications   dicyclomine (BENTYL) injection 20 mg (20 mg Intramuscular Given 8/19/24 1747)   sodium chloride 0.9 % bolus 1,000 mL (0 mL Intravenous Stopped 8/19/24 2013)   morphine injection 4 mg (4 mg Intravenous Given 8/19/24 2105)       CONSULTS:  None    CRITICAL CARE:  There was significant risk of life threatening deterioration of patient's condition requiring my direct management. Critical care time 0 minutes, excluding any documented procedures.    FINAL " Http://www.asge.org/PatientInfoIndex.aspx?su=493. April 21st 2009.     IMPRESSION      1. Lower abdominal pain          DISPOSITION / PLAN     ED Disposition       ED Disposition   Discharge    Condition   Stable    Comment   --               PATIENT REFERRED TO:  ChristopherBriana, APRN  [address] [id number] [phone]    In 1 week      [facility]  [address] [facility] [address]  [facility] [address] [phone]          DISCHARGE MEDICATIONS:     Medication List        START taking these medications      dicyclomine 10 MG capsule  Commonly known as: BENTYL  Take 1 capsule by mouth 3 (Three) Times a Day As Needed for Abdominal Cramping.     predniSONE 20 MG tablet  Commonly known as: DELTASONE  Take 1 tablet by mouth Daily for 3 days.            CONTINUE taking these medications      cetirizine 10 MG tablet  Commonly known as: zyrTEC     ciprofloxacin-dexAMETHasone 0.3-0.1 % otic suspension  Commonly known as: CIPRODEX  Administer 4 drops into the left ear 2 (Two) Times a Day.     cloNIDine 0.1 MG tablet  Commonly known as: CATAPRES     desvenlafaxine 50 MG 24 hr tablet  Commonly known as: PRISTIQ               Where to Get Your Medications        These medications were sent to [facility] [id number] - [place], [place] - [address] [place] [address] AT [facility]. - [phone] [facility] [phone]   [address] [place] [address], [facility] [address]      Phone: [phone]   dicyclomine 10 MG capsule  predniSONE 20 MG tablet         Electronically signed by Ozzie Guzmán DO, 08/19/24, 5:07 PM EDT.    Emergency Medicine Physician  [facility]  (Please note that portions of thisnote were completed with a voice recognition program.  Efforts were made to edit the dictations but occasionally words are mis-transcribed.)       Ozzie Guzmán DO  08/22/24 0704

## 2024-08-19 NOTE — Clinical Note
[facility] EMERGENCY DEPARTMENT  [address] [facility] [address]  Phone: [phone]    Mckayla Turpin was seen and treated in our emergency department on 8/19/2024.  She may return to work on 08/21/2024.         Thank you for choosing [facility].    Ozzie Guzmán, DO

## 2024-08-19 NOTE — Clinical Note
[facility] EMERGENCY DEPARTMENT  [address] [facility] [address]  Phone: [phone]    Mckayla Turpin was seen and treated in our emergency department on 8/19/2024.  She may return to work on 08/20/2024.         Thank you for choosing [facility].    Ozzie Guzmán, DO

## 2024-08-19 NOTE — Clinical Note
[facility] EMERGENCY DEPARTMENT  [address] [facility] [address]  Phone: [phone]    Mckayla Turpin was seen and treated in our emergency department on 8/19/2024.  She may return to work on 08/20/2024.         Thank you for choosing [facility].    Ozzie Guzmán, DO

## 2024-08-20 NOTE — DISCHARGE INSTRUCTIONS
If you notice any concerning symptoms, please return to the ER immediately. These can include but are not limited to: worsening of you condition, fevers, chills, shortness of breath, vomiting, weakness of the extremities, changes in your mental status, numbness, pale extremities, or chest pain.     Take medications as prescribed, your pharmacist may have additional recommendations concerning these medications.    For pain use ibuprofen (Motrin) or acetaminophen (Tylenol), unless prescribed medications that also contain these medications.  You can take over the counter acetaminophen or ibuprofen, please follow the directions as dosages differ. Do not take ibuprofen if you have a history of peptic ulcers, kidney disease, bariatric surgery, or are currently pregnant.  Do not take Tylenol if you have a history of liver disease or alcohol use disorder.        THANK YOU!!! From [facility] Emergency Department    On behalf of the Emergency Department staff at [facility], I would like to thank you for giving us the opportunity to address your health care needs and concerns. We hope that during your visit, our service was delivered in a professional and caring manner. Please keep [facility] in mind as we walk with you down the path to your own personal wellness. Please expect follow-up phone calls concerning additional care and questions about your experience.      You have received additional information specific to your diagnosis in these discharge instructions, please read these fully.  Anytime you have been seen in the emergency department we recommend close follow up with your primary care provider or specialist, please follow these directions as indicated.

## 2024-11-10 ENCOUNTER — HOSPITAL ENCOUNTER (EMERGENCY)
Facility: HOSPITAL | Age: 34
Discharge: HOME | End: 2024-11-10
Attending: EMERGENCY MEDICINE | Admitting: EMERGENCY MEDICINE
Payer: MEDICAID

## 2024-11-10 ENCOUNTER — APPOINTMENT (OUTPATIENT)
Dept: CT IMAGING | Facility: HOSPITAL | Age: 34
End: 2024-11-10
Payer: MEDICAID

## 2024-11-10 VITALS
OXYGEN SATURATION: 99 % | HEIGHT: 64 IN | HEART RATE: 88 BPM | BODY MASS INDEX: 31.24 KG/M2 | RESPIRATION RATE: 18 BRPM | WEIGHT: 183 LBS | TEMPERATURE: 98.6 F | DIASTOLIC BLOOD PRESSURE: 74 MMHG | SYSTOLIC BLOOD PRESSURE: 123 MMHG

## 2024-11-10 DIAGNOSIS — N83.202 CYST OF LEFT OVARY: Primary | ICD-10-CM

## 2024-11-10 DIAGNOSIS — K52.9 ENTERITIS: ICD-10-CM

## 2024-11-10 LAB
ALBUMIN SERPL-MCNC: 4.3 G/DL (ref 3.5–5.2)
ALBUMIN/GLOB SERPL: 1.5 G/DL
ALP SERPL-CCNC: 79 U/L (ref 39–117)
ALT SERPL W P-5'-P-CCNC: 11 U/L (ref 1–33)
ANION GAP SERPL CALCULATED.3IONS-SCNC: 9.6 MMOL/L (ref 5–15)
AST SERPL-CCNC: 16 U/L (ref 1–32)
BASOPHILS # BLD AUTO: 0.05 10*3/MM3 (ref 0–0.2)
BASOPHILS NFR BLD AUTO: 0.6 % (ref 0–1.5)
BILIRUB SERPL-MCNC: 0.3 MG/DL (ref 0–1.2)
BUN SERPL-MCNC: 4 MG/DL (ref 6–20)
BUN/CREAT SERPL: 6 (ref 7–25)
CALCIUM SPEC-SCNC: 8.9 MG/DL (ref 8.6–10.5)
CHLORIDE SERPL-SCNC: 101 MMOL/L (ref 98–107)
CO2 SERPL-SCNC: 28.4 MMOL/L (ref 22–29)
CREAT SERPL-MCNC: 0.67 MG/DL (ref 0.57–1)
DEPRECATED RDW RBC AUTO: 39.9 FL (ref 37–54)
EGFRCR SERPLBLD CKD-EPI 2021: 117.1 ML/MIN/1.73
EOSINOPHIL # BLD AUTO: 0.48 10*3/MM3 (ref 0–0.4)
EOSINOPHIL NFR BLD AUTO: 5.6 % (ref 0.3–6.2)
ERYTHROCYTE [DISTWIDTH] IN BLOOD BY AUTOMATED COUNT: 11.9 % (ref 12.3–15.4)
GLOBULIN UR ELPH-MCNC: 2.9 GM/DL
GLUCOSE SERPL-MCNC: 116 MG/DL (ref 65–99)
HCT VFR BLD AUTO: 38.8 % (ref 34–46.6)
HGB BLD-MCNC: 13.3 G/DL (ref 12–15.9)
HOLD SPECIMEN: NORMAL
HOLD SPECIMEN: NORMAL
IMM GRANULOCYTES # BLD AUTO: 0.03 10*3/MM3 (ref 0–0.05)
IMM GRANULOCYTES NFR BLD AUTO: 0.4 % (ref 0–0.5)
LIPASE SERPL-CCNC: 24 U/L (ref 13–60)
LYMPHOCYTES # BLD AUTO: 1.59 10*3/MM3 (ref 0.7–3.1)
LYMPHOCYTES NFR BLD AUTO: 18.6 % (ref 19.6–45.3)
MCH RBC QN AUTO: 31.4 PG (ref 26.6–33)
MCHC RBC AUTO-ENTMCNC: 34.3 G/DL (ref 31.5–35.7)
MCV RBC AUTO: 91.7 FL (ref 79–97)
MONOCYTES # BLD AUTO: 0.8 10*3/MM3 (ref 0.1–0.9)
MONOCYTES NFR BLD AUTO: 9.3 % (ref 5–12)
NEUTROPHILS NFR BLD AUTO: 5.62 10*3/MM3 (ref 1.7–7)
NEUTROPHILS NFR BLD AUTO: 65.5 % (ref 42.7–76)
NRBC BLD AUTO-RTO: 0 /100 WBC (ref 0–0.2)
PLATELET # BLD AUTO: 274 10*3/MM3 (ref 140–450)
PMV BLD AUTO: 9.5 FL (ref 6–12)
POTASSIUM SERPL-SCNC: 3.5 MMOL/L (ref 3.5–5.2)
PROT SERPL-MCNC: 7.2 G/DL (ref 6–8.5)
RBC # BLD AUTO: 4.23 10*6/MM3 (ref 3.77–5.28)
SODIUM SERPL-SCNC: 139 MMOL/L (ref 136–145)
WBC NRBC COR # BLD AUTO: 8.57 10*3/MM3 (ref 3.4–10.8)
WHOLE BLOOD HOLD COAG: NORMAL
WHOLE BLOOD HOLD SPECIMEN: NORMAL

## 2024-11-10 PROCEDURE — 83690 ASSAY OF LIPASE: CPT | Performed by: NURSE PRACTITIONER

## 2024-11-10 PROCEDURE — 96374 THER/PROPH/DIAG INJ IV PUSH: CPT

## 2024-11-10 PROCEDURE — 74177 CT ABD & PELVIS W/CONTRAST: CPT

## 2024-11-10 PROCEDURE — 25510000001 IOPAMIDOL 61 % SOLUTION: Performed by: EMERGENCY MEDICINE

## 2024-11-10 PROCEDURE — 99285 EMERGENCY DEPT VISIT HI MDM: CPT | Performed by: EMERGENCY MEDICINE

## 2024-11-10 PROCEDURE — 85025 COMPLETE CBC W/AUTO DIFF WBC: CPT | Performed by: EMERGENCY MEDICINE

## 2024-11-10 PROCEDURE — 80053 COMPREHEN METABOLIC PANEL: CPT | Performed by: EMERGENCY MEDICINE

## 2024-11-10 PROCEDURE — 25010000002 MORPHINE PER 10 MG: Performed by: EMERGENCY MEDICINE

## 2024-11-10 RX ORDER — MORPHINE SULFATE 2 MG/ML
2 INJECTION, SOLUTION INTRAMUSCULAR; INTRAVENOUS ONCE
Status: COMPLETED | OUTPATIENT
Start: 2024-11-10 | End: 2024-11-10

## 2024-11-10 RX ORDER — PANTOPRAZOLE SODIUM 40 MG/1
40 TABLET, DELAYED RELEASE ORAL DAILY
Qty: 30 TABLET | Refills: 0 | Status: SHIPPED | OUTPATIENT
Start: 2024-11-10 | End: 2024-12-10

## 2024-11-10 RX ORDER — IOPAMIDOL 612 MG/ML
100 INJECTION, SOLUTION INTRAVASCULAR
Status: COMPLETED | OUTPATIENT
Start: 2024-11-10 | End: 2024-11-10

## 2024-11-10 RX ORDER — SODIUM CHLORIDE 0.9 % (FLUSH) 0.9 %
10 SYRINGE (ML) INJECTION AS NEEDED
Status: DISCONTINUED | OUTPATIENT
Start: 2024-11-10 | End: 2024-11-10 | Stop reason: HOSPADM

## 2024-11-10 RX ORDER — MORPHINE SULFATE 2 MG/ML
2 INJECTION, SOLUTION INTRAMUSCULAR; INTRAVENOUS ONCE
Status: DISCONTINUED | OUTPATIENT
Start: 2024-11-10 | End: 2024-11-10 | Stop reason: HOSPADM

## 2024-11-10 RX ADMIN — MORPHINE SULFATE 2 MG: 2 INJECTION, SOLUTION INTRAMUSCULAR; INTRAVENOUS at 13:06

## 2024-11-10 RX ADMIN — IOPAMIDOL 100 ML: 612 INJECTION, SOLUTION INTRAVENOUS at 13:30

## 2024-11-10 RX ADMIN — Medication 10 ML: at 13:08

## 2024-11-10 NOTE — ED PROVIDER NOTES
"Subjective:  History of Present Illness:    Patient is a 34-year-old female with history of ovarian cyst.  Reports past surgeries include appendectomy, cholecystectomy, tonsillectomy.  Presents to the ER today with lower abdominal pain that is generalized.  She denies changes in bowel or bladder habit.  Denies fever.  Denies OTC medication or home remedy.  Denies alleviating or exacerbating factors.    Nurses Notes reviewed and agree, including vitals, allergies, social history and prior medical history.     REVIEW OF SYSTEMS: All systems reviewed and not pertinent unless noted.  Review of Systems   Gastrointestinal:  Positive for abdominal pain.   All other systems reviewed and are negative.      Past Medical History:   Diagnosis Date    Herniated disc, cervical     Spinal column deformity     Tachycardia        Allergies:    Vancomycin and Sulfa antibiotics      Past Surgical History:   Procedure Laterality Date    APPENDECTOMY  12/2022    CHOLECYSTECTOMY  2017    COLONOSCOPY      ENDOMETRIAL ABLATION      EYE SURGERY Left     x 24    TONSILECTOMY, ADENOIDECTOMY, BILATERAL MYRINGOTOMY AND TUBES      TUBAL ABDOMINAL LIGATION  2014         Social History     Socioeconomic History    Marital status:    Tobacco Use    Smoking status: Never    Smokeless tobacco: Never   Vaping Use    Vaping status: Never Used   Substance and Sexual Activity    Alcohol use: Never    Drug use: Never    Sexual activity: Defer         Family History   Problem Relation Age of Onset    Hypertension Mother     Diabetes Mother     Cancer Mother     Other Father         hypoglycemia    Hyperlipidemia Father     Hypertension Father     Depression Brother     Anxiety disorder Brother     Thyroid disease Paternal Grandmother        Objective  Physical Exam:  /74   Pulse 88   Temp 98.6 °F (37 °C) (Oral)   Resp 18   Ht 162.6 cm (64\")   Wt 83 kg (183 lb)   SpO2 99%   BMI 31.41 kg/m²      Physical Exam  Vitals and nursing note " reviewed.   Constitutional:       Appearance: She is well-developed and normal weight.   HENT:      Head: Normocephalic and atraumatic.      Mouth/Throat:      Mouth: Mucous membranes are moist.      Pharynx: Oropharynx is clear.   Eyes:      Extraocular Movements: Extraocular movements intact.      Pupils: Pupils are equal, round, and reactive to light.   Cardiovascular:      Rate and Rhythm: Normal rate and regular rhythm.      Heart sounds: Normal heart sounds.   Pulmonary:      Effort: Pulmonary effort is normal.      Breath sounds: Normal breath sounds.   Abdominal:      General: Abdomen is flat. Bowel sounds are normal.      Palpations: Abdomen is soft.      Tenderness: There is generalized abdominal tenderness.   Skin:     General: Skin is warm.      Capillary Refill: Capillary refill takes less than 2 seconds.   Neurological:      General: No focal deficit present.      Mental Status: She is alert and oriented to person, place, and time.   Psychiatric:         Mood and Affect: Mood normal.         Behavior: Behavior normal.         Procedures    ED Course:         Lab Results (last 24 hours)       Procedure Component Value Units Date/Time    CBC Auto Differential [[id number]]  (Abnormal) Collected: 11/10/24 1211    Specimen: Blood Updated: 11/10/24 1217     WBC 8.57 10*3/mm3      RBC 4.23 10*6/mm3      Hemoglobin 13.3 g/dL      Hematocrit 38.8 %      MCV 91.7 fL      MCH 31.4 pg      MCHC 34.3 g/dL      RDW 11.9 %      RDW-SD 39.9 fl      MPV 9.5 fL      Platelets 274 10*3/mm3      Neutrophil % 65.5 %      Lymphocyte % 18.6 %      Monocyte % 9.3 %      Eosinophil % 5.6 %      Basophil % 0.6 %      Immature Grans % 0.4 %      Neutrophils, Absolute 5.62 10*3/mm3      Lymphocytes, Absolute 1.59 10*3/mm3      Monocytes, Absolute 0.80 10*3/mm3      Eosinophils, Absolute 0.48 10*3/mm3      Basophils, Absolute 0.05 10*3/mm3      Immature Grans, Absolute 0.03 10*3/mm3      nRBC 0.0 /100 WBC     Comprehensive  Metabolic Panel [[id number]]  (Abnormal) Collected: 11/10/24 1211    Specimen: Blood Updated: 11/10/24 1232     Glucose 116 mg/dL      BUN 4 mg/dL      Creatinine 0.67 mg/dL      Sodium 139 mmol/L      Potassium 3.5 mmol/L      Chloride 101 mmol/L      CO2 28.4 mmol/L      Calcium 8.9 mg/dL      Total Protein 7.2 g/dL      Albumin 4.3 g/dL      ALT (SGPT) 11 U/L      AST (SGOT) 16 U/L      Alkaline Phosphatase 79 U/L      Total Bilirubin 0.3 mg/dL      Globulin 2.9 gm/dL      A/G Ratio 1.5 g/dL      BUN/Creatinine Ratio 6.0     Anion Gap 9.6 mmol/L      eGFR 117.1 mL/min/1.73     Narrative:      GFR Normal >60  Chronic Kidney Disease <60  Kidney Failure <15      Lipase [[id number]]  (Normal) Collected: 11/10/24 1211    Specimen: Blood Updated: 11/10/24 1232     Lipase 24 U/L              CT Abdomen Pelvis With Contrast    Result Date: 11/10/2024  PROCEDURE: CT ABDOMEN PELVIS W CONTRAST-  HISTORY: Lower abdominal pain  Comparison: August 16, 2014  FINDINGS: Axial CT images of the abdomen and pelvis were obtained with IV contrast only. Coronal and sagittal reformatted images were also obtained. This study was performed with techniques to keep radiation doses as low as reasonably achievable, (ALARA). Individualized dose reduction techniques using automated exposure control or adjustment of mA and/or kV according to the patient size were employed.  The lung bases are clear. The liver has an unremarkable appearance, without evidence of mass. Postoperative changes are seen from cholecystectomy. There is no evidence of biliary ductal dilatation. The pancreas appears normal. The spleen size is within normal limits. A less than 1 cm left renal cyst is stable. There is no evidence of hydronephrosis. There is no evidence of adenopathy. No abnormal fluid collection is seen. No localized inflammatory process is identified.  Images of the pelvis reveal small left ovarian cysts. Multiple fluid-filled bowel loops are seen in a  nonspecific pattern. The appendix is not visualized and presumably surgically absent. A small umbilical hernia is seen containing fat only.      Impression: Multiple fluid-filled bowel loops as a nonspecific finding but can be seen with an ileus or enteritis.  Small left ovarian cysts.      CTDI: 11.27 mGy DLP:567.8 mGy.cm  This report was signed and finalized on 11/10/2024 1:39 PM by Kraig Combs MD.          MDM      Initial impression of presenting illness: Patient is a 34-year-old female with history of ovarian cyst.  Reports past surgeries include appendectomy, cholecystectomy, tonsillectomy.  Presents to the ER today with lower abdominal pain that is generalized.  She denies changes in bowel or bladder habit.  Denies fever.  Denies OTC medication or home remedy.  Denies alleviating or exacerbating factors.    DDX: includes but is not limited to: Ovarian cyst, gastritis, colitis, enterocolitis, diverticulitis or other    Patient arrives stable with vitals interpreted by myself.     Pertinent features from physical exam: Lung sounds are clear bilaterally throughout.  Abdomen is soft.  There is mild generalized tenderness.  Bowel sounds are normal.  Heart sounds are normal..    Initial diagnostic plan: CBC, CMP, lipase, CT abdomen pelvis    Results from initial plan were reviewed and interpreted by me revealing CBC is within appropriate range.  CMP is within appropriate range.  Lipase is normal.  CT abdomen pelvis with the following impression multiple fluid-filled bowel as a nonspecific finding but can be seen with ileus or enteritis.  Small left ovarian cyst    Diagnostic information from other sources: Chart review    Interventions / Re-evaluation: Vital signs stable throughout encounter    Results/clinical rationale were discussed with patient    Consultations/Discussion of results with other physicians: N/A    Disposition plan: Patient is hemodynamically stable nontoxic-appearing appropriate discharge.   Outpatient follow-up with PCP in 1 week.  Follow-up ER for new or worse symptoms.  Already has scheduled follow-up with OB/GYN and gastroenterology  -----        Final diagnoses:   Cyst of left ovary   Enteritis          Baltazar Hernandez, APRN  11/10/24 1842

## 2024-11-10 NOTE — DISCHARGE INSTRUCTIONS
Please follow-up with OB/GYN and gastroenterology as scheduled.  Please follow clear liquid diet over the next 24 hours.  May advance to brat diet bananas rice applesauce and toast.  Follow-up with ER for new or worsening symptoms.

## 2024-11-25 ENCOUNTER — OFFICE VISIT (OUTPATIENT)
Dept: INTERNAL MEDICINE | Facility: CLINIC | Age: 34
End: 2024-11-25
Payer: MEDICAID

## 2024-11-25 VITALS
HEART RATE: 97 BPM | DIASTOLIC BLOOD PRESSURE: 70 MMHG | OXYGEN SATURATION: 99 % | SYSTOLIC BLOOD PRESSURE: 124 MMHG | WEIGHT: 176 LBS | BODY MASS INDEX: 30.05 KG/M2 | HEIGHT: 64 IN | TEMPERATURE: 98.3 F

## 2024-11-25 DIAGNOSIS — R10.13 DYSPEPSIA: ICD-10-CM

## 2024-11-25 DIAGNOSIS — Z23 NEED FOR INFLUENZA VACCINATION: ICD-10-CM

## 2024-11-25 DIAGNOSIS — Z84.89 FAMILY HISTORY OF GENETIC DISEASE: ICD-10-CM

## 2024-11-25 DIAGNOSIS — K59.00 CONSTIPATION, UNSPECIFIED CONSTIPATION TYPE: ICD-10-CM

## 2024-11-25 DIAGNOSIS — M62.838 MUSCLE SPASMS OF NECK: ICD-10-CM

## 2024-11-25 DIAGNOSIS — J30.89 ENVIRONMENTAL AND SEASONAL ALLERGIES: ICD-10-CM

## 2024-11-25 DIAGNOSIS — Z80.3 FAMILY HISTORY OF BREAST CANCER IN MOTHER: ICD-10-CM

## 2024-11-25 DIAGNOSIS — F51.04 PSYCHOPHYSIOLOGICAL INSOMNIA: ICD-10-CM

## 2024-11-25 DIAGNOSIS — F41.9 ANXIETY AND DEPRESSION: ICD-10-CM

## 2024-11-25 DIAGNOSIS — F32.A ANXIETY AND DEPRESSION: ICD-10-CM

## 2024-11-25 DIAGNOSIS — Z76.89 ENCOUNTER TO ESTABLISH CARE: Primary | ICD-10-CM

## 2024-11-25 PROCEDURE — 90471 IMMUNIZATION ADMIN: CPT | Performed by: NURSE PRACTITIONER

## 2024-11-25 PROCEDURE — 99214 OFFICE O/P EST MOD 30 MIN: CPT | Performed by: NURSE PRACTITIONER

## 2024-11-25 PROCEDURE — 1126F AMNT PAIN NOTED NONE PRSNT: CPT | Performed by: NURSE PRACTITIONER

## 2024-11-25 PROCEDURE — 1160F RVW MEDS BY RX/DR IN RCRD: CPT | Performed by: NURSE PRACTITIONER

## 2024-11-25 PROCEDURE — 90656 IIV3 VACC NO PRSV 0.5 ML IM: CPT | Performed by: NURSE PRACTITIONER

## 2024-11-25 PROCEDURE — 1159F MED LIST DOCD IN RCRD: CPT | Performed by: NURSE PRACTITIONER

## 2024-11-25 RX ORDER — BUSPIRONE HYDROCHLORIDE 15 MG/1
15 TABLET ORAL ONCE AS NEEDED
COMMUNITY
End: 2024-11-25

## 2024-11-25 RX ORDER — CETIRIZINE HYDROCHLORIDE 10 MG/1
10 TABLET ORAL DAILY
Qty: 90 TABLET | Refills: 3 | Status: SHIPPED | OUTPATIENT
Start: 2024-11-25

## 2024-11-25 RX ORDER — POLYETHYLENE GLYCOL 3350 17 G/17G
17 POWDER, FOR SOLUTION ORAL DAILY
Qty: 850 G | Refills: 12 | Status: SHIPPED | OUTPATIENT
Start: 2024-11-25

## 2024-11-25 RX ORDER — PANTOPRAZOLE SODIUM 40 MG/1
40 TABLET, DELAYED RELEASE ORAL DAILY
Qty: 90 TABLET | Refills: 3 | Status: SHIPPED | OUTPATIENT
Start: 2024-11-25

## 2024-11-25 RX ORDER — SUVOREXANT 15 MG/1
TABLET, FILM COATED ORAL
COMMUNITY

## 2024-11-25 RX ORDER — METHOCARBAMOL 500 MG/1
500 TABLET, FILM COATED ORAL 3 TIMES DAILY
Qty: 270 TABLET | Refills: 3 | Status: SHIPPED | OUTPATIENT
Start: 2024-11-25

## 2024-11-25 RX ORDER — MULTIVIT-MIN/IRON/FOLIC ACID/K 18-600-40
2000 CAPSULE ORAL DAILY
Qty: 90 CAPSULE | Refills: 3 | Status: SHIPPED | OUTPATIENT
Start: 2024-11-25

## 2024-11-25 RX ORDER — PROPRANOLOL HYDROCHLORIDE 10 MG/1
10 TABLET ORAL 3 TIMES DAILY
Qty: 90 TABLET | Refills: 2 | Status: SHIPPED | OUTPATIENT
Start: 2024-11-25

## 2024-11-25 RX ORDER — METHOCARBAMOL 500 MG/1
500 TABLET, FILM COATED ORAL 3 TIMES DAILY
COMMUNITY
End: 2024-11-25 | Stop reason: SDUPTHER
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
## 2024-12-05 ENCOUNTER — HOSPITAL ENCOUNTER (OUTPATIENT)
Dept: CT IMAGING | Facility: HOSPITAL | Age: 34
Discharge: HOME | End: 2024-12-05
Payer: MEDICAID

## 2024-12-05 ENCOUNTER — OFFICE VISIT (OUTPATIENT)
Dept: GASTROENTEROLOGY | Facility: CLINIC | Age: 34
End: 2024-12-05
Payer: MEDICAID

## 2024-12-05 ENCOUNTER — LAB (OUTPATIENT)
Dept: LAB | Facility: HOSPITAL | Age: 34
End: 2024-12-05
Payer: MEDICAID

## 2024-12-05 VITALS
OXYGEN SATURATION: 92 % | HEART RATE: 111 BPM | BODY MASS INDEX: 30.9 KG/M2 | WEIGHT: 180 LBS | SYSTOLIC BLOOD PRESSURE: 138 MMHG | DIASTOLIC BLOOD PRESSURE: 86 MMHG

## 2024-12-05 DIAGNOSIS — R11.0 NAUSEA: ICD-10-CM

## 2024-12-05 DIAGNOSIS — R19.4 CHANGE IN BOWEL HABITS: ICD-10-CM

## 2024-12-05 DIAGNOSIS — R10.30 LOWER ABDOMINAL PAIN: Primary | ICD-10-CM

## 2024-12-05 DIAGNOSIS — R10.817 GENERALIZED ABDOMINAL TENDERNESS, REBOUND TENDERNESS PRESENCE NOT SPECIFIED: ICD-10-CM

## 2024-12-05 DIAGNOSIS — R10.30 LOWER ABDOMINAL PAIN: ICD-10-CM

## 2024-12-05 DIAGNOSIS — Z80.0 FAMILY HISTORY OF COLON CANCER: ICD-10-CM

## 2024-12-05 DIAGNOSIS — K21.9 GASTROESOPHAGEAL REFLUX DISEASE, UNSPECIFIED WHETHER ESOPHAGITIS PRESENT: ICD-10-CM

## 2024-12-05 LAB
25(OH)D3 SERPL-MCNC: 13.6 NG/ML (ref 30–100)
ADV 40+41 DNA STL QL NAA+NON-PROBE: NOT DETECTED
ALBUMIN SERPL-MCNC: 4 G/DL (ref 3.5–5.2)
ALBUMIN/GLOB SERPL: 1.1 G/DL
ALP SERPL-CCNC: 75 U/L (ref 39–117)
ALT SERPL W P-5'-P-CCNC: 20 U/L (ref 1–33)
ANION GAP SERPL CALCULATED.3IONS-SCNC: 10.2 MMOL/L (ref 5–15)
AST SERPL-CCNC: 20 U/L (ref 1–32)
ASTRO TYP 1-8 RNA STL QL NAA+NON-PROBE: NOT DETECTED
BILIRUB SERPL-MCNC: 0.4 MG/DL (ref 0–1.2)
BUN SERPL-MCNC: 5 MG/DL (ref 6–20)
BUN/CREAT SERPL: 6.4 (ref 7–25)
C CAYETANENSIS DNA STL QL NAA+NON-PROBE: NOT DETECTED
C COLI+JEJ+UPSA DNA STL QL NAA+NON-PROBE: NOT DETECTED
C DIFF TOX GENS STL QL NAA+PROBE: NOT DETECTED
CALCIUM SPEC-SCNC: 9.2 MG/DL (ref 8.6–10.5)
CHLORIDE SERPL-SCNC: 101 MMOL/L (ref 98–107)
CO2 SERPL-SCNC: 25.8 MMOL/L (ref 22–29)
CREAT SERPL-MCNC: 0.78 MG/DL (ref 0.57–1)
CRP SERPL-MCNC: 0.33 MG/DL (ref 0–0.5)
CRYPTOSP DNA STL QL NAA+NON-PROBE: NOT DETECTED
DEPRECATED RDW RBC AUTO: 40 FL (ref 37–54)
E HISTOLYT DNA STL QL NAA+NON-PROBE: NOT DETECTED
EAEC PAA PLAS AGGR+AATA ST NAA+NON-PRB: NOT DETECTED
EC STX1+STX2 GENES STL QL NAA+NON-PROBE: NOT DETECTED
EGFRCR SERPLBLD CKD-EPI 2021: 101.7 ML/MIN/1.73
EPEC EAE GENE STL QL NAA+NON-PROBE: NOT DETECTED
ERYTHROCYTE [DISTWIDTH] IN BLOOD BY AUTOMATED COUNT: 11.7 % (ref 12.3–15.4)
ETEC LTA+ST1A+ST1B TOX ST NAA+NON-PROBE: NOT DETECTED
FERRITIN SERPL-MCNC: 60.9 NG/ML (ref 13–150)
FOLATE SERPL-MCNC: 5.39 NG/ML (ref 4.78–24.2)
G LAMBLIA DNA STL QL NAA+NON-PROBE: NOT DETECTED
GLOBULIN UR ELPH-MCNC: 3.7 GM/DL
GLUCOSE SERPL-MCNC: 95 MG/DL (ref 65–99)
HCT VFR BLD AUTO: 40.1 % (ref 34–46.6)
HGB BLD-MCNC: 13.4 G/DL (ref 12–15.9)
IGA1 MFR SER: 269 MG/DL (ref 70–400)
IRON 24H UR-MRATE: 115 MCG/DL (ref 37–145)
IRON SATN MFR SERPL: 37 % (ref 20–50)
MCH RBC QN AUTO: 31.1 PG (ref 26.6–33)
MCHC RBC AUTO-ENTMCNC: 33.4 G/DL (ref 31.5–35.7)
MCV RBC AUTO: 93 FL (ref 79–97)
NOROVIRUS GI+II RNA STL QL NAA+NON-PROBE: NOT DETECTED
P SHIGELLOIDES DNA STL QL NAA+NON-PROBE: NOT DETECTED
PLATELET # BLD AUTO: 278 10*3/MM3 (ref 140–450)
PMV BLD AUTO: 10.1 FL (ref 6–12)
POTASSIUM SERPL-SCNC: 3.9 MMOL/L (ref 3.5–5.2)
PROT SERPL-MCNC: 7.7 G/DL (ref 6–8.5)
RBC # BLD AUTO: 4.31 10*6/MM3 (ref 3.77–5.28)
RVA RNA STL QL NAA+NON-PROBE: NOT DETECTED
S ENT+BONG DNA STL QL NAA+NON-PROBE: NOT DETECTED
SAPO I+II+IV+V RNA STL QL NAA+NON-PROBE: NOT DETECTED
SHIGELLA SP+EIEC IPAH ST NAA+NON-PROBE: NOT DETECTED
SODIUM SERPL-SCNC: 137 MMOL/L (ref 136–145)
T4 FREE SERPL-MCNC: 1.13 NG/DL (ref 0.92–1.68)
TIBC SERPL-MCNC: 311 MCG/DL (ref 298–536)
TRANSFERRIN SERPL-MCNC: 209 MG/DL (ref 200–360)
TSH SERPL DL<=0.05 MIU/L-ACNC: 1.48 UIU/ML (ref 0.27–4.2)
V CHOL+PARA+VUL DNA STL QL NAA+NON-PROBE: NOT DETECTED
V CHOLERAE DNA STL QL NAA+NON-PROBE: NOT DETECTED
VIT B12 BLD-MCNC: 386 PG/ML (ref 211–946)
WBC NRBC COR # BLD AUTO: 7.36 10*3/MM3 (ref 3.4–10.8)
Y ENTEROCOL DNA STL QL NAA+NON-PROBE: NOT DETECTED

## 2024-12-05 PROCEDURE — 84439 ASSAY OF FREE THYROXINE: CPT

## 2024-12-05 PROCEDURE — 86140 C-REACTIVE PROTEIN: CPT

## 2024-12-05 PROCEDURE — 82653 EL-1 FECAL QUANTITATIVE: CPT

## 2024-12-05 PROCEDURE — 83540 ASSAY OF IRON: CPT

## 2024-12-05 PROCEDURE — 99244 OFF/OP CNSLTJ NEW/EST MOD 40: CPT | Performed by: PHYSICIAN ASSISTANT

## 2024-12-05 PROCEDURE — 82728 ASSAY OF FERRITIN: CPT

## 2024-12-05 PROCEDURE — 1160F RVW MEDS BY RX/DR IN RCRD: CPT | Performed by: PHYSICIAN ASSISTANT

## 2024-12-05 PROCEDURE — 82306 VITAMIN D 25 HYDROXY: CPT

## 2024-12-05 PROCEDURE — 84443 ASSAY THYROID STIM HORMONE: CPT

## 2024-12-05 PROCEDURE — 86364 TISS TRNSGLTMNASE EA IG CLAS: CPT

## 2024-12-05 PROCEDURE — 25510000001 IOPAMIDOL 61 % SOLUTION: Performed by: PHYSICIAN ASSISTANT

## 2024-12-05 PROCEDURE — 80053 COMPREHEN METABOLIC PANEL: CPT

## 2024-12-05 PROCEDURE — 85027 COMPLETE CBC AUTOMATED: CPT

## 2024-12-05 PROCEDURE — 74177 CT ABD & PELVIS W/CONTRAST: CPT

## 2024-12-05 PROCEDURE — 1159F MED LIST DOCD IN RCRD: CPT | Performed by: PHYSICIAN ASSISTANT

## 2024-12-05 PROCEDURE — 82784 ASSAY IGA/IGD/IGG/IGM EACH: CPT

## 2024-12-05 PROCEDURE — 83993 ASSAY FOR CALPROTECTIN FECAL: CPT

## 2024-12-05 PROCEDURE — 84466 ASSAY OF TRANSFERRIN: CPT

## 2024-12-05 PROCEDURE — 36415 COLL VENOUS BLD VENIPUNCTURE: CPT

## 2024-12-05 PROCEDURE — 82607 VITAMIN B-12: CPT

## 2024-12-05 PROCEDURE — 87507 IADNA-DNA/RNA PROBE TQ 12-25: CPT

## 2024-12-05 PROCEDURE — 82746 ASSAY OF FOLIC ACID SERUM: CPT

## 2024-12-05 PROCEDURE — 87493 C DIFF AMPLIFIED PROBE: CPT

## 2024-12-05 RX ORDER — IOPAMIDOL 612 MG/ML
100 INJECTION, SOLUTION INTRAVASCULAR
Status: COMPLETED | OUTPATIENT
Start: 2024-12-05 | End: 2024-12-05

## 2024-12-05 RX ORDER — DICYCLOMINE HYDROCHLORIDE 10 MG/ML
INJECTION INTRAMUSCULAR
COMMUNITY
Start: 2024-11-01 | End: 2024-12-05

## 2024-12-05 RX ORDER — SODIUM, POTASSIUM,MAG SULFATES 17.5-3.13G
SOLUTION, RECONSTITUTED, ORAL ORAL
Qty: 354 ML | Refills: 0 | Status: SHIPPED | OUTPATIENT
Start: 2024-12-05

## 2024-12-05 RX ORDER — SODIUM CHLORIDE 9 MG/ML
30 INJECTION, SOLUTION INTRAVENOUS CONTINUOUS PRN
OUTPATIENT
Start: 2024-12-05 | End: 2024-12-06

## 2024-12-05 RX ADMIN — IOPAMIDOL 100 ML: 612 INJECTION, SOLUTION INTRAVENOUS at 12:00

## 2024-12-05 NOTE — PROGRESS NOTES
New Patient Consult      Date: 2024   Patient Name: Mckayla Turpin  MRN: [id number]  : 1990     Primary Care Provider: Irma Brooks APRN  Referring Provider: Ramirez    Chief Complaint   Patient presents with    Abdominal Pain     History of Present Illness: Mckayla Turpin is a 34 y.o. female who is here today as a consultation with Gastroenterology for evaluation of Abdominal Pain.    She has been having lower abdominal pain for several months now. She has been evaluated in the [facility] ED x2 due to this (Aug and 2024), told pain was related to an ovarian cyst initially but then later, was told she had enteritis. Has pain which is sharp, located in lower abdomen on both sides. Pain is constant but gets worse at times, sometimes severe. Eating seems to make the pain worse most of the time. BMs are described as varied in consistencies. She will have loose or difficult small stools. Does sometimes have blood in stools. Sometimes stools are dark. Taking miralax once daily, has taken this for years.     Has heartburn which has been present for years, intermittently. Taking protonix 40 mg once daily with good control. Has nausea, no vomiting.     Had colonoscopy in the past, approx , was told normal other than colon polyps. One of her grandparents had colon cancer. No prior EGD.     No prior history of liver disease. Nonalcoholic.     Subjective      Past Medical History:   Diagnosis Date    Allergic     Anxiety     Depression     Headache     Herniated disc, cervical     Spinal column deformity     Tachycardia      Past Surgical History:   Procedure Laterality Date    APPENDECTOMY  2022    CHOLECYSTECTOMY  2017    COLONOSCOPY      ENDOMETRIAL ABLATION      EYE SURGERY Left     x 24    TONSILECTOMY, ADENOIDECTOMY, BILATERAL MYRINGOTOMY AND TUBES      TUBAL ABDOMINAL LIGATION  2014     Family History   Problem Relation Age of Onset    Hypertension Mother     Diabetes Mother     Cancer  Mother     Hyperlipidemia Father     Hypertension Father     Depression Brother     Anxiety disorder Brother     Thyroid disease Paternal Grandmother     Colon cancer Maternal Grandfather     Colon polyps Maternal Grandfather     Pancreatitis Maternal Aunt      Social History     Socioeconomic History    Marital status:    Tobacco Use    Smoking status: Never    Smokeless tobacco: Never   Vaping Use    Vaping status: Never Used   Substance and Sexual Activity    Alcohol use: Never    Drug use: Never    Sexual activity: Yes     Partners: Male     Birth control/protection: Tubal ligation     Comment: Endometrial Ablation     Current Outpatient Medications:     amitriptyline (ELAVIL) 25 MG tablet, Take 1 tablet by mouth At Night As Needed for Sleep., Disp: 90 tablet, Rfl: 3    cetirizine (zyrTEC) 10 MG tablet, Take 1 tablet by mouth Daily., Disp: 90 tablet, Rfl: 3    Cholecalciferol (Vitamin D) 50 MCG (2000 UT) capsule, Take 1 capsule by mouth Daily., Disp: 90 capsule, Rfl: 3    dicyclomine (Bentyl) 10 MG/ML injection, , Disp: , Rfl:     methocarbamol (ROBAXIN) 500 MG tablet, Take 1 tablet by mouth 3 (Three) Times a Day., Disp: 270 tablet, Rfl: 3    pantoprazole (PROTONIX) 40 MG EC tablet, Take 1 tablet by mouth Daily., Disp: 90 tablet, Rfl: 3    polyethylene glycol (MIRALAX) 17 GM/SCOOP powder, Take 17 g by mouth Daily., Disp: 850 g, Rfl: 12    propranolol (INDERAL) 10 MG tablet, Take 1 tablet by mouth 3 (Three) Times a Day., Disp: 90 tablet, Rfl: 2    Suvorexant (Belsomra) 15 MG tablet, , Disp: , Rfl:     Allergies   Allergen Reactions    Vancomycin Other (See Comments)     Francisco syndrome    Sulfa Antibiotics Diarrhea     Rash also     The following portions of the patient's history were reviewed and updated as appropriate: allergies, current medications, past family history, past medical history, past social history, past surgical history and problem list.    Objective     Physical Exam  Vitals reviewed.    Constitutional:       General: She is not in acute distress.     Appearance: Normal appearance. She is well-developed. She is not ill-appearing or diaphoretic.   HENT:      Head: Normocephalic and atraumatic.      Right Ear: External ear normal.      Left Ear: External ear normal.      Nose: Nose normal.   Eyes:      General: No scleral icterus.        Right eye: No discharge.         Left eye: No discharge.      Conjunctiva/sclera: Conjunctivae normal.      Comments: Left eye prosthetic   Neck:      Vascular: No JVD.   Cardiovascular:      Rate and Rhythm: Normal rate and regular rhythm.      Heart sounds: Normal heart sounds. No murmur heard.     No friction rub. No gallop.   Pulmonary:      Effort: Pulmonary effort is normal. No respiratory distress.      Breath sounds: Normal breath sounds. No wheezing or rales.   Chest:      Chest wall: No tenderness.   Abdominal:      General: Bowel sounds are normal. There is distension (mild).      Palpations: Abdomen is soft. There is no mass.      Tenderness: There is abdominal tenderness (generalized, moderate with voluntary guarding). There is no guarding.   Musculoskeletal:         General: No deformity. Normal range of motion.      Cervical back: Normal range of motion.   Skin:     General: Skin is warm and dry.      Findings: No erythema or rash.   Neurological:      Mental Status: She is alert and oriented to person, place, and time.      Coordination: Coordination normal.   Psychiatric:         Behavior: Behavior normal.         Thought Content: Thought content normal.         Judgment: Judgment normal.      Comments: Anxious affect         Vitals:    12/05/24 0856   BP: 138/86   Pulse: 111   SpO2: 92%   Weight: 81.6 kg (180 lb)     Results Review:   I have reviewed the patient's new clinical and imaging results.    Admission on 11/10/2024, Discharged on 11/10/2024   Component Date Value Ref Range Status    WBC 11/10/2024 8.57  3.40 - 10.80 10*3/mm3 Final    RBC  11/10/2024 4.23  3.77 - 5.28 10*6/mm3 Final    Hemoglobin 11/10/2024 13.3  12.0 - 15.9 g/dL Final    Hematocrit 11/10/2024 38.8  34.0 - 46.6 % Final    MCV 11/10/2024 91.7  79.0 - 97.0 fL Final    MCH 11/10/2024 31.4  26.6 - 33.0 pg Final    MCHC 11/10/2024 34.3  31.5 - 35.7 g/dL Final    RDW 11/10/2024 11.9 (L)  12.3 - 15.4 % Final    RDW-SD 11/10/2024 39.9  37.0 - 54.0 fl Final    MPV 11/10/2024 9.5  6.0 - 12.0 fL Final    Platelets 11/10/2024 274  140 - 450 10*3/mm3 Final    Glucose 11/10/2024 116 (H)  65 - 99 mg/dL Final    BUN 11/10/2024 4 (L)  6 - 20 mg/dL Final    Creatinine 11/10/2024 0.67  0.57 - 1.00 mg/dL Final    Sodium 11/10/2024 139  136 - 145 mmol/L Final    Potassium 11/10/2024 3.5  3.5 - 5.2 mmol/L Final    Chloride 11/10/2024 101  98 - 107 mmol/L Final    CO2 11/10/2024 28.4  22.0 - 29.0 mmol/L Final    Calcium 11/10/2024 8.9  8.6 - 10.5 mg/dL Final    Total Protein 11/10/2024 7.2  6.0 - 8.5 g/dL Final    Albumin 11/10/2024 4.3  3.5 - 5.2 g/dL Final    ALT (SGPT) 11/10/2024 11  1 - 33 U/L Final    AST (SGOT) 11/10/2024 16  1 - 32 U/L Final    Alkaline Phosphatase 11/10/2024 79  39 - 117 U/L Final    Total Bilirubin 11/10/2024 0.3  0.0 - 1.2 mg/dL Final    Globulin 11/10/2024 2.9  gm/dL Final    A/G Ratio 11/10/2024 1.5  g/dL Final    BUN/Creatinine Ratio 11/10/2024 6.0 (L)  7.0 - 25.0 Final    Anion Gap 11/10/2024 9.6  5.0 - 15.0 mmol/L Final    eGFR 11/10/2024 117.1  >60.0 mL/min/1.73 Final    Lipase 11/10/2024 24  13 - 60 U/L Final      CT Abdomen Pelvis With Contrast 11/10/2024  FINDINGS: Axial CT images of the abdomen and pelvis were obtained with  IV contrast only. Coronal and sagittal reformatted images were also  obtained. This study was performed with techniques to keep radiation  doses as low as reasonably achievable, (ALARA). Individualized dose  reduction techniques using automated exposure control or adjustment of  mA and/or kV according to the patient size were employed.  The lung  bases are clear. The liver has an unremarkable appearance,  without evidence of mass. Postoperative changes are seen from  cholecystectomy. There is no evidence of biliary ductal dilatation. The  pancreas appears normal. The spleen size is within normal limits. A less  than 1 cm left renal cyst is stable. There is no evidence of  hydronephrosis. There is no evidence of adenopathy. No abnormal fluid  collection is seen. No localized inflammatory process is identified.   Images of the pelvis reveal small left ovarian cysts. Multiple  fluid-filled bowel loops are seen in a nonspecific pattern. The appendix  is not visualized and presumably surgically absent. A small umbilical  hernia is seen containing fat only.  Impression  Multiple fluid-filled bowel loops as a nonspecific finding but can be seen with an ileus or enteritis.  Small left ovarian cysts.         Assessment / Plan      1. Lower abdominal pain  2. Nausea  3. Family history of colon cancer  4. Change in bowel habits  5. Generalized abdominal tenderness, rebound tenderness presence not specified  6. GERD  Lower abdominal pain present for several months now but worsening. She has been evaluated in the [facility] ED x2 due to this (Aug and Nov 2024), told pain was related to an ovarian cyst initially but then later, was told she had enteritis. Labs were unremarkable. Pain has doubled since her last ED visit. She is holding her abdomen due to the pain and has moderate generalized tenderness son exam. She reports abdominal pain is constant but gets worse at times, sometimes severe. Eating makes the pain worse. BMs are varied. Does sometimes have blood in stools. Sometimes stools are dark. Taking miralax once daily, has taken this for years for baseline constipation. Has heartburn which has been present for years, intermittently. Taking protonix 40 mg once daily with good control. Has nausea, no vomiting. Last colonoscopy 2019, was told normal other than colon polyps.  No prior EGD. Based on worsening GI complaints with suspected underlying IBS with constipation, she needs further evaluation, need to rule out IBD among others.     STAT CTAP with both oral and IV contrast, will have today  EGD and colonoscopy soon, will place on cancellation list  Offered bentyl, she declined  Labs  Stool testing  Low FODMAP diet  Will recommend further based on results    - Calprotectin, Fecal - Stool, Per Rectum; Future  - Clostridioides difficile Toxin, PCR - Stool, Per Rectum; Future  - Gastrointestinal Panel, PCR - Stool, Per Rectum; Future  - Pancreatic Elastase, Fecal - Stool, Per Rectum; Future    - CT Abdomen Pelvis With Contrast; Future    - Comprehensive Metabolic Panel; Future  - CBC (No Diff); Future  - C-reactive Protein; Future  - Vitamin D 25 Hydroxy; Future  - Vitamin B12; Future  - Folate; Future  - Ferritin; Future  - Iron Profile; Future  - TSH; Future  - T4, Free; Future  - Tissue Transglutaminase, IgA; Future  - IgA; Future    She will have an esophagogastroduodenoscopy and colonoscopy performed with monitored anesthesia care. The indications, technique, alternatives and potential risk and complications were discussed with the patient including but not limited to bleeding, bowel perforations, missing lesions and anesthetic complications. The patient understands and wishes to proceed with the procedure and has given their verbal consent. Written patient education information was given to the patient. She should follow up in the office after this procedure to discuss the results and further recommendations can be made at that time. The patient will call if they have further questions before procedure.  - Case Request  - sodium-potassium-magnesium sulfates (SUPREP) 17.5-3.13-1.6 GM/177ML solution oral solution; Take 2 bottles by mouth 1 (One) Time for 1 dose. Please see prep instructions from office  Dispense: 354 mL; Refill: 0    Follow Up:   Return for follow up after procedure  to discuss results.    Angelique Montgomery PA-C  [facility]  12/5/2024  17:00 EST    Dictated Utilizing Dragon Dictation: Part of this note may be an electronic transcription/translation of spoken language to printed text using the Dragon Dictation System.

## 2024-12-06 ENCOUNTER — PATIENT MESSAGE (OUTPATIENT)
Dept: INTERNAL MEDICINE | Facility: CLINIC | Age: 34
End: 2024-12-06
Payer: MEDICAID

## 2024-12-06 DIAGNOSIS — F41.9 ANXIETY AND DEPRESSION: ICD-10-CM

## 2024-12-06 DIAGNOSIS — F32.A ANXIETY AND DEPRESSION: ICD-10-CM

## 2024-12-06 DIAGNOSIS — F51.04 PSYCHOPHYSIOLOGICAL INSOMNIA: ICD-10-CM

## 2024-12-06 LAB — TTG IGA SER-ACNC: <2 U/ML (ref 0–3)

## 2024-12-06 RX ORDER — ERGOCALCIFEROL 1.25 MG/1
50000 CAPSULE, LIQUID FILLED ORAL WEEKLY
Qty: 12 CAPSULE | Refills: 0 | Status: SHIPPED | OUTPATIENT
Start: 2024-12-06

## 2024-12-06 RX ORDER — CALCIUM CARBONATE 300MG(750)
1 TABLET,CHEWABLE ORAL NIGHTLY
Qty: 30 TABLET | Refills: 2 | Status: SHIPPED | OUTPATIENT
Start: 2024-12-06

## 2024-12-06 RX ORDER — AMITRIPTYLINE HYDROCHLORIDE 50 MG/1
50 TABLET ORAL NIGHTLY PRN
Qty: 30 TABLET | Refills: 2 | Status: SHIPPED | OUTPATIENT
Start: 2024-12-06

## 2024-12-09 LAB
CALPROTECTIN STL-MCNT: 8 UG/G (ref 0–120)
ELASTASE PANC STL-MCNT: 406 UG ELAST./G

## 2024-12-10 ENCOUNTER — ANESTHESIA EVENT (OUTPATIENT)
Dept: GASTROENTEROLOGY | Facility: HOSPITAL | Age: 34
End: 2024-12-10
Payer: MEDICAID

## 2024-12-10 ENCOUNTER — HOSPITAL ENCOUNTER (OUTPATIENT)
Facility: HOSPITAL | Age: 34
Setting detail: HOSPITAL OUTPATIENT SURGERY
Discharge: HOME | End: 2024-12-10
Attending: INTERNAL MEDICINE | Admitting: INTERNAL MEDICINE
Payer: MEDICAID

## 2024-12-10 ENCOUNTER — ANESTHESIA (OUTPATIENT)
Dept: GASTROENTEROLOGY | Facility: HOSPITAL | Age: 34
End: 2024-12-10
Payer: MEDICAID

## 2024-12-10 VITALS
HEART RATE: 81 BPM | OXYGEN SATURATION: 100 % | DIASTOLIC BLOOD PRESSURE: 77 MMHG | TEMPERATURE: 98 F | RESPIRATION RATE: 16 BRPM | SYSTOLIC BLOOD PRESSURE: 112 MMHG

## 2024-12-10 DIAGNOSIS — R11.0 NAUSEA: ICD-10-CM

## 2024-12-10 DIAGNOSIS — R10.30 LOWER ABDOMINAL PAIN: ICD-10-CM

## 2024-12-10 LAB
B-HCG UR QL: NEGATIVE
EXPIRATION DATE: NORMAL
GLUCOSE BLDC GLUCOMTR-MCNC: 89 MG/DL (ref 70–130)
INTERNAL NEGATIVE CONTROL: NORMAL
INTERNAL POSITIVE CONTROL: NORMAL
Lab: NORMAL

## 2024-12-10 PROCEDURE — 82948 REAGENT STRIP/BLOOD GLUCOSE: CPT

## 2024-12-10 PROCEDURE — 81025 URINE PREGNANCY TEST: CPT | Performed by: INTERNAL MEDICINE

## 2024-12-10 PROCEDURE — 25010000002 PROPOFOL 10 MG/ML EMULSION: Performed by: NURSE ANESTHETIST, CERTIFIED REGISTERED

## 2024-12-10 PROCEDURE — 25010000002 ONDANSETRON PER 1 MG: Performed by: NURSE ANESTHETIST, CERTIFIED REGISTERED

## 2024-12-10 PROCEDURE — 25010000002 LIDOCAINE (CARDIAC): Performed by: NURSE ANESTHETIST, CERTIFIED REGISTERED

## 2024-12-10 PROCEDURE — 25810000003 SODIUM CHLORIDE 0.9 % SOLUTION: Performed by: NURSE ANESTHETIST, CERTIFIED REGISTERED

## 2024-12-10 RX ORDER — SODIUM CHLORIDE 9 MG/ML
30 INJECTION, SOLUTION INTRAVENOUS CONTINUOUS PRN
Status: DISCONTINUED | OUTPATIENT
Start: 2024-12-10 | End: 2024-12-10 | Stop reason: HOSPADM

## 2024-12-10 RX ORDER — SODIUM CHLORIDE 9 MG/ML
1000 INJECTION, SOLUTION INTRAVENOUS CONTINUOUS
Status: DISCONTINUED | OUTPATIENT
Start: 2024-12-10 | End: 2024-12-10 | Stop reason: HOSPADM

## 2024-12-10 RX ORDER — PROPOFOL 10 MG/ML
VIAL (ML) INTRAVENOUS CONTINUOUS PRN
Status: DISCONTINUED | OUTPATIENT
Start: 2024-12-10 | End: 2024-12-10 | Stop reason: SURG

## 2024-12-10 RX ORDER — ONDANSETRON 2 MG/ML
INJECTION INTRAMUSCULAR; INTRAVENOUS AS NEEDED
Status: DISCONTINUED | OUTPATIENT
Start: 2024-12-10 | End: 2024-12-10 | Stop reason: SURG

## 2024-12-10 RX ORDER — SIMETHICONE 40MG/0.6ML
SUSPENSION, DROPS(FINAL DOSAGE FORM)(ML) ORAL AS NEEDED
Status: DISCONTINUED | OUTPATIENT
Start: 2024-12-10 | End: 2024-12-10 | Stop reason: HOSPADM

## 2024-12-10 RX ADMIN — PROPOFOL 250 MCG/KG/MIN: 10 INJECTION, EMULSION INTRAVENOUS at 09:19

## 2024-12-10 RX ADMIN — ONDANSETRON 4 MG: 2 INJECTION INTRAMUSCULAR; INTRAVENOUS at 09:26

## 2024-12-10 RX ADMIN — SODIUM CHLORIDE 1000 ML: 9 INJECTION, SOLUTION INTRAVENOUS at 11:00

## 2024-12-10 RX ADMIN — LIDOCAINE HYDROCHLORIDE 60 MG: 20 INJECTION, SOLUTION INTRAVENOUS at 09:19

## 2024-12-10 NOTE — ANESTHESIA POSTPROCEDURE EVALUATION
Patient: Mckayla Turpin    Procedure Summary       Date: 12/10/24 Room / Location: [facility] ENDOSCOPY 2 / [facility] ENDOSCOPY    Anesthesia Start: 0914 Anesthesia Stop: 0946    Procedures:       Esophagogastroduodenoscopy with biopsy      Colonoscopy with biopsy (Anus) Diagnosis:       Lower abdominal pain      Nausea      (Lower abdominal pain [R10.30])      (Nausea [R11.0])    Surgeons: Jace Whittaker MD Provider: Shaq Chávez CRNA    Anesthesia Type: MAC ASA Status: 2            Anesthesia Type: MAC    Vitals  No vitals data found for the desired time range.          Post Anesthesia Care and Evaluation    Patient location during evaluation: PHASE II  Patient participation: complete - patient participated  Level of consciousness: awake and alert  Pain score: 0  Pain management: satisfactory to patient    Airway patency: patent  Anesthetic complications: No anesthetic complications  PONV Status: none  Cardiovascular status: acceptable and stable  Respiratory status: acceptable and spontaneous ventilation  Hydration status: acceptable    Comments: Vitals signs as noted in nursing documentation as per protocol.

## 2024-12-10 NOTE — H&P
[facility]  HISTORY AND PHYSICAL    Patient Name: Mckayla Turpin  : 1990  MRN: [id number]    Chief Complaint:   For EGD/ colonoscopy    History Of Presenting Illness:    Nausea   GERD  Lower abdo pain   Change in bowel habit    Past Medical History:   Diagnosis Date    Allergic     Anxiety     Depression     Full dentures     Headache     Herniated disc, cervical     PONV (postoperative nausea and vomiting)     Sleep apnea     Spinal column deformity     Tachycardia        Past Surgical History:   Procedure Laterality Date    ANTERIOR CERVICAL DISCECTOMY W/ FUSION      hardware implanted    APPENDECTOMY  2022    CHOLECYSTECTOMY  2017    COLONOSCOPY      ENDOMETRIAL ABLATION      EYE SURGERY Left     x 24    OTHER SURGICAL HISTORY      LOOP recorder implant    TONSILECTOMY, ADENOIDECTOMY, BILATERAL MYRINGOTOMY AND TUBES      TUBAL ABDOMINAL LIGATION         Social History     Socioeconomic History    Marital status:    Tobacco Use    Smoking status: Never    Smokeless tobacco: Never   Vaping Use    Vaping status: Never Used   Substance and Sexual Activity    Alcohol use: Never    Drug use: Never    Sexual activity: Yes     Partners: Male     Birth control/protection: Tubal ligation     Comment: Endometrial Ablation       Family History   Problem Relation Age of Onset    Hypertension Mother     Diabetes Mother     Cancer Mother     Hyperlipidemia Father     Hypertension Father     Depression Brother     Anxiety disorder Brother     Thyroid disease Paternal Grandmother     Colon cancer Maternal Grandfather     Colon polyps Maternal Grandfather     Pancreatitis Maternal Aunt        Prior to Admission Medications:  Medications Prior to Admission   Medication Sig Dispense Refill Last Dose/Taking    amitriptyline (ELAVIL) 50 MG tablet Take 1 tablet by mouth At Night As Needed for Sleep. 30 tablet 2 2024    cetirizine (zyrTEC) 10 MG tablet Take 1 tablet by mouth Daily. 90 tablet  3 12/9/2024    Magnesium 400 MG tablet Take 1 tablet by mouth Every Night. Indications: Insomnia 30 tablet 2 12/9/2024    methocarbamol (ROBAXIN) 500 MG tablet Take 1 tablet by mouth 3 (Three) Times a Day. 270 tablet 3 12/9/2024    pantoprazole (PROTONIX) 40 MG EC tablet Take 1 tablet by mouth Daily. 90 tablet 3 12/9/2024    polyethylene glycol (MIRALAX) 17 GM/SCOOP powder Take 17 g by mouth Daily. 850 g 12 12/9/2024    propranolol (INDERAL) 10 MG tablet Take 1 tablet by mouth 3 (Three) Times a Day. 90 tablet 2 12/10/2024 Morning    sodium-potassium-magnesium sulfates (SUPREP) 17.5-3.13-1.6 GM/177ML solution oral solution Take 2 bottles by mouth 1 (One) Time for 1 dose. Please see prep instructions from office 354 mL 0 12/10/2024 Morning    vitamin D (ERGOCALCIFEROL) 1.25 MG (61505 UT) capsule capsule Take 1 capsule by mouth 1 (One) Time Per Week. 12 capsule 0 12/9/2024    Cholecalciferol (Vitamin D) 50 MCG (2000 UT) capsule Take 1 capsule by mouth Daily. (Patient not taking: Reported on 12/9/2024) 90 capsule 3 Unknown    Suvorexant (Belsomra) 15 MG tablet  (Patient not taking: Reported on 12/9/2024)   Unknown       Allergies:  Allergies   Allergen Reactions    Vancomycin Other (See Comments)     Francisco syndrome    Sulfa Antibiotics Diarrhea     Rash also        Vitals: Temp:  [98.1 °F (36.7 °C)] 98.1 °F (36.7 °C)  Heart Rate:  [94] 94  Resp:  [16] 16  BP: (113)/(86) 113/86    Review Of Systems:  Constitutional:  Negative for chills, fever, and unexpected weight change.  Respiratory:  Negative for cough, chest tightness, shortness of breath, and wheezing.  Cardiovascular:  Negative for chest pain, palpitations, and leg swelling.  Gastrointestinal:  Negative for abdominal distention, abdominal pain, nausea, vomiting.  Neurological:  Negative for weakness, numbness, and headaches.     Physical Exam:    General Appearance:  Alert, cooperative, in no acute distress.   Lungs:   Clear to auscultation, respirations  regular, even and                 unlabored.   Heart:  Regular rhythm and normal rate.   Abdomen:   Normal bowel sounds, no masses, no organomegaly. Soft, nontender, nondistended   Neurologic: Alert and oriented x 3. Moves all four limbs equally       Assessment & Plan     Assessment:  Active Problems:    Lower abdominal pain    Nausea      Plan: Esophagogastroduodenoscopy with possible biopsy, polypectomy, dilatation, endoscopic band ligation or control of bleeding (N/A), Colonoscopy with possible biopsy, polypectomy, ablation of arteriovenous malformations or control of bleeding (N/A)     Jace Whittaker MD  12/10/2024

## 2024-12-10 NOTE — NURSING NOTE
Dr. Whittaker came to see patient at bedside, patient is alert and oriented at this time. He discussed with her that she could go home.    Yolanda Dubois RN  12/10/2024

## 2024-12-10 NOTE — ANESTHESIA PREPROCEDURE EVALUATION
Anesthesia Evaluation     Patient summary reviewed, Nursing notes reviewed and pregnancy test completed   history of anesthetic complications:  PONV  NPO Solid Status: > 8 hours  NPO Liquid Status: > 8 hours           Airway   Mallampati: II  Possible difficult intubation  Dental - normal exam     Pulmonary    Cardiovascular - normal exam        Neuro/Psych  (+) headaches, psychiatric history Anxiety and Depression  GI/Hepatic/Renal/Endo    (+) GERD    Musculoskeletal     (+) arthralgias, back pain  Abdominal    Substance History      OB/GYN          Other                    Anesthesia Plan    ASA 2     MAC     intravenous induction     Anesthetic plan, risks, benefits, and alternatives have been provided, discussed and informed consent has been obtained with: spouse/significant other.    CODE STATUS:

## 2024-12-10 NOTE — NURSING NOTE
Pt returned to Phase 2 @ 1142 after evaluation in PACU. Pt vitals were stable and A&O x4. KE Hastings assessed patient at bedside and agreed pt was stable and ready for discharge.

## 2024-12-10 NOTE — DISCHARGE INSTRUCTIONS
- Discharge patient to home (ambulatory).   - FODMAP diet.   - Follow an antireflux regimen.   - PPI daily  - Avoid NSAIDS  - Await pathology results.   - Return to my office in 8 weeks.    No pushing, pulling, tugging,  heavy lifting, or strenuous activity.  No major decision making, driving, or drinking alcoholic beverages for 24 hours. ( due to the medications you have  received)  Always use good hand hygiene/washing techniques.  NO driving while taking pain medications.    * if you have an incision:  Check your incision area every day for signs of infection.   Check for:  * more redness, swelling, or pain  *more fluid or blood  *warmth  *pus or bad smell    To assist you in voiding:  Drink plenty of fluids  Listen to running water while attempting to void.    If you are unable to urinate and you have an uncomfortable urge to void or it has been   6 hours since you were discharged, return to the Emergency Room

## 2024-12-10 NOTE — NURSING NOTE
At 1020, pt ambulated to the stretcher from the bathroom after voiding and having a bowel movement. At this time, I went over the pt's discharge instructions with her and her family member as she was sipping on her water. I finished my instructions at 1028 and explained to her and the family that it was time to get up and get dressed for discharge. At this time, the pt was nodding off and wouldn't respond to me. I tried to wake her up several times, and she would just look out into the distance. Her family member also tried to wake her up and she still didn't respond. I tried to give the pt verbal commands, and she didn't follow them and would continue to stare out into the distance. I called for help at 1031.     Carlito RN, Kimi RN, Bia RN and I continued to try to wake the pt with sternal rubs, finger tip presses, and continued to give verbal commands. Carlito COLVIN called for anesthesia to assess the pt at the bedside STAT. Jhonatan Hastings CRNA was at the bedside at 1038 to assess the pt. I informed him of the pt's PMH, medications received today, home medications, and previous events when pt arrived to phase II. We assessed the pt's reflexes as well as her pupils which were PERRLA. Per CRNA, he wanted to get the pt's blood sugar. Pt's blood sugar was 89mg/dL at 1040. At 1044, CRNA ordered pt to be sent to PACU for further evaluation and for further orders. Pt arrived in PACU at 1045.

## 2024-12-10 NOTE — PROGRESS NOTES
This patient had a elective EGD colonoscopy this morning and was uneventful.  Postprocedure I spoke to the patient about the procedure details at recovery.  However few minutes later patient became catatonic without responding to verbal stimuli or painful stimuli.  Her vitals were normal.  She had a good pupillary reflex there was no rigidity there was no increase in the temperature    She was monitored few more minutes and patient woke up and started communicating again.  She remember where she is she remembers why she came to the hospital and she does not remember what happened for the last few minutes.    Clinically there is no signs of any CVA.  At this time it is unclear nature of this event however, functional and psychological component suspected.    Will discharge home.  However I have advised the patient to come to emergency room if there is any changes in the mental status or any new symptoms.  Called her immediate contact her father and discussed the event.  If any further episodes like this she needs a neurological evaluation.

## 2024-12-11 ENCOUNTER — TELEPHONE (OUTPATIENT)
Dept: INTERNAL MEDICINE | Facility: CLINIC | Age: 34
End: 2024-12-11
Payer: MEDICAID

## 2024-12-11 NOTE — TELEPHONE ENCOUNTER
Left vm asking that patient call back to discuss. What kind of episode did patient have after colonoscopy?

## 2024-12-11 NOTE — TELEPHONE ENCOUNTER
Pt returned call. She said that she had her upper scope and colonoscopy and when she was in recovery she went to the bathroom but when she got back to her bed she went unresponsive. She said her eyes were open and blinking but she wasn't reacting to sternum rubs or bottom of foot. She said she did not remember it. Took roughly 25-30 minutes for her to come to is what she was told. They released her later that day. Her new job is wanting her to see pcp to be cleared that she is well enough to go to work. She would like call back if possible. Pls advise.

## 2024-12-11 NOTE — TELEPHONE ENCOUNTER
Caller: Mckayla Turpin    Relationship to patient: Self    Best call back number: [phone]     Chief complaint: FOLLOW UP FROM AN EPISODE AFTER HER COLONOSCOPY     Type of visit: OFFICE    Requested date: AS SOON AS POSSIBLE     If rescheduling, when is the original appointment: 12/17/24     Additional notes:PATIENT STARTING A NEW JOB AND THEY WILL NOT LET HER START UNTIL SHE HAS BEEN SEEN AND CLEARED REGARDING THIS EPISODE SHE HAD AFTER HER COLONOSCOPY ON 12/10/24

## 2024-12-12 ENCOUNTER — OFFICE VISIT (OUTPATIENT)
Dept: INTERNAL MEDICINE | Facility: CLINIC | Age: 34
End: 2024-12-12
Payer: MEDICAID

## 2024-12-12 VITALS
OXYGEN SATURATION: 98 % | DIASTOLIC BLOOD PRESSURE: 72 MMHG | TEMPERATURE: 97.8 F | HEIGHT: 64 IN | BODY MASS INDEX: 30.73 KG/M2 | SYSTOLIC BLOOD PRESSURE: 124 MMHG | HEART RATE: 102 BPM | WEIGHT: 180 LBS

## 2024-12-12 DIAGNOSIS — Z87.898 HISTORY OF ANESTHESIA REACTION: ICD-10-CM

## 2024-12-12 DIAGNOSIS — R40.4 EPISODES OF STARING: ICD-10-CM

## 2024-12-12 DIAGNOSIS — R41.3 AMNESIA: Primary | ICD-10-CM

## 2024-12-12 LAB — REF LAB TEST METHOD: NORMAL

## 2024-12-12 PROCEDURE — 1126F AMNT PAIN NOTED NONE PRSNT: CPT | Performed by: NURSE PRACTITIONER

## 2024-12-12 PROCEDURE — 99214 OFFICE O/P EST MOD 30 MIN: CPT | Performed by: NURSE PRACTITIONER

## 2024-12-12 PROCEDURE — 1159F MED LIST DOCD IN RCRD: CPT | Performed by: NURSE PRACTITIONER

## 2024-12-12 PROCEDURE — 1160F RVW MEDS BY RX/DR IN RCRD: CPT | Performed by: NURSE PRACTITIONER

## 2024-12-12 NOTE — LETTER
December 12, 2024     Patient: Mckayla Turpin   YOB: 1990   Date of Visit: 12/12/2024       To Whom It May Concern:    It is my medical opinion that Mckayla Turpin may start work on 12/16/24.       Sincerely,    Irma Brooks, APRN

## 2024-12-12 NOTE — TELEPHONE ENCOUNTER
Pt also sent a [employer] message in regards to this. I responded offering her an appointment this afternoon.
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
## 2024-12-16 ENCOUNTER — PATIENT MESSAGE (OUTPATIENT)
Dept: GASTROENTEROLOGY | Facility: CLINIC | Age: 34
End: 2024-12-16
Payer: MEDICAID

## 2024-12-16 ENCOUNTER — TELEPHONE (OUTPATIENT)
Dept: GASTROENTEROLOGY | Facility: CLINIC | Age: 34
End: 2024-12-16
Payer: MEDICAID

## 2024-12-16 DIAGNOSIS — A04.8 H. PYLORI INFECTION: Primary | ICD-10-CM

## 2024-12-16 RX ORDER — OMEPRAZOLE MAGNESIUM, AMOXICILLIN AND RIFABUTIN 10; 250; 12.5 MG/1; MG/1; MG/1
4 CAPSULE, DELAYED RELEASE ORAL EVERY 8 HOURS
Qty: 168 CAPSULE | Refills: 0 | Status: SHIPPED | OUTPATIENT
Start: 2024-12-16 | End: 2024-12-17

## 2024-12-16 NOTE — TELEPHONE ENCOUNTER
She has H pylori on recent EGD biopsies. I have sent Inderjit for treatment. Please ck on coverage and let her know. She will hold protonix while on therapy and resume after.

## 2024-12-16 NOTE — TELEPHONE ENCOUNTER
I let the patient know via [employer]. (She messaged in). It needs a PA so I will get that submitted.

## 2024-12-17 ENCOUNTER — PATIENT MESSAGE (OUTPATIENT)
Dept: GASTROENTEROLOGY | Facility: CLINIC | Age: 34
End: 2024-12-17
Payer: MEDICAID

## 2024-12-17 DIAGNOSIS — R10.13 DYSPEPSIA: ICD-10-CM

## 2024-12-17 RX ORDER — BISMUTH SUBCITRATE POTASSIUM, METRONIDAZOLE AND TETRACYCLINE HYDROCHLORIDE 140; 125; 125 MG/1; MG/1; MG/1
3 CAPSULE ORAL
Qty: 120 CAPSULE | Refills: 0 | Status: SHIPPED | OUTPATIENT
Start: 2024-12-17 | End: 2024-12-27

## 2024-12-17 NOTE — TELEPHONE ENCOUNTER
Pylera has been sent instead, please ck on coverage. She will continue her protonix 40 mg once daily with this therapy. Also, should holf magnesium if she gets to take the Pylera during therapy.

## 2024-12-17 NOTE — TELEPHONE ENCOUNTER
Pt notified via [employer] and I left her a vm with info on the medication per the pharmacy they have to order.

## 2024-12-19 ENCOUNTER — OFFICE VISIT (OUTPATIENT)
Dept: NEUROLOGY | Facility: CLINIC | Age: 34
End: 2024-12-19
Payer: MEDICAID

## 2024-12-19 VITALS
DIASTOLIC BLOOD PRESSURE: 72 MMHG | SYSTOLIC BLOOD PRESSURE: 128 MMHG | RESPIRATION RATE: 14 BRPM | WEIGHT: 180.2 LBS | TEMPERATURE: 98.9 F | OXYGEN SATURATION: 98 % | BODY MASS INDEX: 30.77 KG/M2 | HEART RATE: 92 BPM | HEIGHT: 64 IN

## 2024-12-19 DIAGNOSIS — G43.909 ACUTE MIGRAINE: ICD-10-CM

## 2024-12-19 DIAGNOSIS — R56.9 SEIZURE-LIKE ACTIVITY: Primary | ICD-10-CM

## 2024-12-19 DIAGNOSIS — R10.2 PELVIC PAIN: Primary | ICD-10-CM

## 2024-12-19 DIAGNOSIS — G43.001 MIGRAINE WITHOUT AURA AND WITH STATUS MIGRAINOSUS, NOT INTRACTABLE: ICD-10-CM

## 2024-12-19 RX ORDER — PANTOPRAZOLE SODIUM 40 MG/1
40 TABLET, DELAYED RELEASE ORAL DAILY
Qty: 90 TABLET | Refills: 3 | Status: CANCELLED | OUTPATIENT
Start: 2024-12-19

## 2024-12-19 RX ORDER — PANTOPRAZOLE SODIUM 40 MG/1
40 TABLET, DELAYED RELEASE ORAL DAILY
Qty: 90 TABLET | Refills: 3 | Status: SHIPPED | OUTPATIENT
Start: 2024-12-19

## 2024-12-19 NOTE — PROGRESS NOTES
"     New Patient Office Visit      Patient Name: Mckayla Turpin  : 1990   MRN: [id number]     Referring Physician: Irma Brooks APRN    Chief Complaint:    Chief Complaint   Patient presents with   • Establish Care     Patient is in office to establish care for work-up of seizures. Pt states she had an upper scope and colonoscopy at the hospital. Post procedure she had an episode where she was non-responsive for about 35 minutes. She has no memory of this episode.        History of Present Illness: Mckayla Turpin is a 34 y.o. female who is here today to establish care with Neurology.  She has never been seen before in neurology. She was referred to us from PCP (Todd) for episodes of absence.     Went for an EGD/Colonoscopy and while she was in recovery, she had an episode of absent period. She was awake but unresponsive for 30-40 minutes following anesthesia. She cannot recall events of episode. She admits she has had issues with anesthesia in the past. She does admit to having periods of absence in her life that occurs 1-2 times per month. She feels she know where she is at physically but she disappears in her mind \"zoning out!\" She feels very tired after these episodes.     [facility] . She has been suffering from migraines since . She describes her migraines as being a dull pressure to unilateral forehead that will then radiate to the entire skull. She feels \"weakness\" with onset of her migraines. Migraines can last > 96 hours. + N/V. + light and sound sensitivity. She likes to sleep these off in a cold dark room. She has taken medication in the past for these. Tried and failed: Topamax, amitriptyline, magnesium , propranolol, Imitrex, Maxalt. She admits she takes high dose nsaids PRN for these.     SOCIAL: Happily engaged, Uziel. Moved here from [place], [place]. Three children (ages 16, 13, 10). 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 No tobacco or vaping. No ETOH. No recreational drugs of cannabis. No "  service.     [facility] Neuro: Maternal Cousin- MS, Maternal Grandfather- PD, Maternal Aunt- Seizures, Maternal Aunt- Seizures, Maternal Aunt- Seizures.     Recent Imaging: NONE    Pertinent Medical History: allergic rhinitis, anxiety, depression, HA, SHIRA, spinal column deformity, cervical disc herniation, left eye prosthetic, Loop recorder    Subjective      Review of Systems:   Review of Systems   Constitutional: Negative.    HENT: Negative.     Eyes: Negative.    Respiratory: Negative.     Cardiovascular: Negative.    Gastrointestinal: Negative.    Endocrine: Negative.    Genitourinary: Negative.    Musculoskeletal: Negative.    Skin: Negative.    Allergic/Immunologic: Negative.    Neurological:  Positive for seizures and headache.   Hematological: Negative.    Psychiatric/Behavioral: Negative.     All other systems reviewed and are negative.      Past Medical History:   Past Medical History:   Diagnosis Date   • Allergic    • Anxiety    • Depression    • Full dentures    • Headache    • Herniated disc, cervical    • Migraine 2004    Very severe   • PONV (postoperative nausea and vomiting)    • Sleep apnea    • Spinal column deformity    • Tachycardia        Past Surgical History:   Past Surgical History:   Procedure Laterality Date   • ANTERIOR CERVICAL DISCECTOMY W/ FUSION      hardware implanted   • APPENDECTOMY  12/2022   • CHOLECYSTECTOMY  2017   • COLONOSCOPY     • COLONOSCOPY N/A 12/10/2024    Procedure: Colonoscopy with biopsy;  Surgeon: Jace Whittaker MD;  Location: [facility] ENDOSCOPY;  Service: Gastroenterology;  Laterality: N/A;   • ENDOMETRIAL ABLATION     • ENDOSCOPY N/A 12/10/2024    Procedure: Esophagogastroduodenoscopy with biopsy;  Surgeon: Jace Whittaker MD;  Location: [facility] ENDOSCOPY;  Service: Gastroenterology;  Laterality: N/A;   • EYE SURGERY Left     x 24   • OTHER SURGICAL HISTORY      LOOP recorder implant   • TONSILECTOMY, ADENOIDECTOMY, BILATERAL MYRINGOTOMY AND TUBES      • TUBAL ABDOMINAL LIGATION  2014       Family History:   Family History   Problem Relation Age of Onset   • Hypertension Mother    • Diabetes Mother    • Cancer Mother    • Neuropathy Mother    • Hyperlipidemia Father    • Hypertension Father    • Depression Brother    • Anxiety disorder Brother    • Pancreatitis Maternal Aunt    • Seizures Maternal Aunt    • Seizures Maternal Aunt    • Seizures Maternal Aunt    • Stroke Maternal Grandmother    • Seizures Maternal Grandmother    • Colon cancer Maternal Grandfather    • Colon polyps Maternal Grandfather    • Thyroid disease Paternal Grandmother        Social History:   Social History     Socioeconomic History   • Marital status:    Tobacco Use   • Smoking status: Never   • Smokeless tobacco: Never   Vaping Use   • Vaping status: Never Used   Substance and Sexual Activity   • Alcohol use: Never   • Drug use: Never   • Sexual activity: Yes     Partners: Male     Birth control/protection: Tubal ligation     Comment: Endometrial Ablation       Medications:     Current Outpatient Medications:   •  amitriptyline (ELAVIL) 50 MG tablet, Take 1 tablet by mouth At Night As Needed for Sleep., Disp: 30 tablet, Rfl: 2  •  Bismuth/Metronidaz/Tetracyclin (Pylera) 140-125-125 MG capsule, Take 3 capsules by mouth 4 (Four) Times a Day Before Meals & at Bedtime for 10 days., Disp: 120 capsule, Rfl: 0  •  cetirizine (zyrTEC) 10 MG tablet, Take 1 tablet by mouth Daily., Disp: 90 tablet, Rfl: 3  •  Cholecalciferol (Vitamin D) 50 MCG (2000 UT) capsule, Take 1 capsule by mouth Daily., Disp: 90 capsule, Rfl: 3  •  Magnesium 400 MG tablet, Take 1 tablet by mouth Every Night. Indications: Insomnia, Disp: 30 tablet, Rfl: 2  •  methocarbamol (ROBAXIN) 500 MG tablet, Take 1 tablet by mouth 3 (Three) Times a Day., Disp: 270 tablet, Rfl: 3  •  pantoprazole (PROTONIX) 40 MG EC tablet, Take 1 tablet by mouth Daily., Disp: 90 tablet, Rfl: 3  •  polyethylene glycol (MIRALAX) 17 GM/SCOOP  "powder, Take 17 g by mouth Daily., Disp: 850 g, Rfl: 12  •  propranolol (INDERAL) 10 MG tablet, Take 1 tablet by mouth 3 (Three) Times a Day., Disp: 90 tablet, Rfl: 2  •  vitamin D (ERGOCALCIFEROL) 1.25 MG (82646 UT) capsule capsule, Take 1 capsule by mouth 1 (One) Time Per Week., Disp: 12 capsule, Rfl: 0  •  rimegepant sulfate (Nurtec) 75 MG tablet, Take 1 tablet by mouth Daily As Needed (migraine)., Disp: 9 tablet, Rfl: 3  •  rimegepant sulfate (Nurtec) 75 MG tablet, Take 1 tablet by mouth As Needed (migraine)., Disp: 3 tablet, Rfl: 0  •  Suvorexant (Belsomra) 15 MG tablet, , Disp: , Rfl:     Allergies:   Allergies   Allergen Reactions   • Vancomycin Other (See Comments)     Francisco syndrome   • Sulfa Antibiotics Diarrhea     Rash also       Objective     Physical Exam:  Vital Signs:   Vitals:    12/19/24 0854   BP: 128/72   BP Location: Left arm   Patient Position: Sitting   Cuff Size: Adult   Pulse: 92   Resp: 14   Temp: 98.9 °F (37.2 °C)   TempSrc: Infrared   SpO2: 98%   Weight: 81.7 kg (180 lb 3.2 oz)   Height: 162.6 cm (64.02\")   PainSc:   8   PainLoc: Head     Body mass index is 30.92 kg/m².     Physical Exam  Vitals and nursing note reviewed.   Constitutional:       General: She is not in acute distress.     Appearance: Normal appearance.   HENT:      Head: Normocephalic.      Nose: Nose normal.      Mouth/Throat:      Mouth: Mucous membranes are moist.      Pharynx: Oropharynx is clear.   Eyes:      General: Lids are normal.      Extraocular Movements: Extraocular movements intact.      Conjunctiva/sclera: Conjunctivae normal.      Pupils: Pupils are equal, round, and reactive to light.      Comments: Left Eye Prosthetic    Musculoskeletal:      Cervical back: Normal range of motion and neck supple.   Skin:     General: Skin is warm and dry.      Capillary Refill: Capillary refill takes less than 2 seconds.   Neurological:      General: No focal deficit present.      Mental Status: She is oriented to " person, place, and time.      Cranial Nerves: No cranial nerve deficit.      Sensory: No sensory deficit.      Motor: Motor strength is normal.No weakness.      Coordination: Coordination normal.      Gait: Gait normal.      Deep Tendon Reflexes: Reflexes normal.   Psychiatric:         Mood and Affect: Mood normal.         Behavior: Behavior normal.         Neurological Exam  Mental Status  Awake, alert and oriented to person, place and time. Oriented to person, place, and time.    Cranial Nerves  CN II: Vision test: Left Eye Prosthetic . Visual acuity is normal. Visual fields full to confrontation.  CN III, IV, VI: Extraocular movements intact bilaterally. Normal lids and orbits bilaterally. Pupils equal round and reactive to light bilaterally.  CN V: Facial sensation is normal.  CN VII: Full and symmetric facial movement.  CN IX, X: Palate elevates symmetrically. Normal gag reflex.  CN XI: Shoulder shrug strength is normal.  CN XII: Tongue midline without atrophy or fasciculations.    Motor  Normal muscle bulk throughout. No fasciculations present. Normal muscle tone. No abnormal involuntary movements. Strength is 5/5 throughout all four extremities.    Sensory  Light touch is normal in upper and lower extremities.     Reflexes    Right pathological reflexes: Keny's absent.  Left pathological reflexes: Keny's absent.    Coordination  Right: Finger-to-nose normal. Rapid alternating movement normal.Left: Finger-to-nose normal. Rapid alternating movement normal.    Gait   Normal gait.Casual gait is normal including stance, stride, and arm swing.      PHQ-9 Total Score:       Assessment / Plan      Assessment/Plan:   Diagnoses and all orders for this visit:    1. Seizure-like activity (Primary)  -     MRI Brain With & Without Contrast; Future  -     EEG; Future  -     Ambulatory Referral to Neurology    2. Acute migraine  -     rimegepant sulfate (Nurtec) 75 MG tablet; Take 1 tablet by mouth Daily As Needed  (migraine).  Dispense: 9 tablet; Refill: 3    3. Migraine without aura and with status migrainosus, not intractable         Follow Up:   Return in about 3 months (around 3/19/2025), or if symptoms worsen or fail to improve.    Anticipatory Guidance and Safety Reviewed  Patient Education Provided  MRI Brain W/WO r/o demyelinating diease   EEG  Neurology referral to board certified epileptologist for EMU  Encouraged HA journaling for trigger identification and prevention  Begin Nurtec 75 mg PRN (abortive); SE reviewed  Encouraged limitations on OTC Nsaids and Tylenol < 2 per week to prevent MOH  Seizure Precautions reviewed: encouraged to avoid driving 90 days following seizure activity, to avoid working in positions of great physical height, to avoid unsupervised hot tubs and baths and to go to ER with seizure activity > 4 minutes.   Work Excuse today  Letter For Employer (see scanned)     RTC PRN or within 12 weeks or sooner with issues     YOVANA Wan  [facility]

## 2024-12-24 ENCOUNTER — PATIENT ROUNDING (BHMG ONLY) (OUTPATIENT)
Dept: NEUROLOGY | Facility: CLINIC | Age: 34
End: 2024-12-24
Payer: MEDICAID

## 2024-12-27 DIAGNOSIS — G43.909 ACUTE MIGRAINE: Primary | ICD-10-CM

## 2024-12-27 RX ORDER — ZAVEGEPANT 10 MG/.1ML
10 SPRAY NASAL AS NEEDED
Qty: 6 EACH | Refills: 3 | Status: SHIPPED | OUTPATIENT
Start: 2024-12-27

## 2024-12-31 ENCOUNTER — PATIENT MESSAGE (OUTPATIENT)
Dept: INTERNAL MEDICINE | Facility: CLINIC | Age: 34
End: 2024-12-31
Payer: MEDICAID

## 2024-12-31 DIAGNOSIS — K92.1 BLACK STOOL: Primary | ICD-10-CM

## 2024-12-31 RX ORDER — FLUCONAZOLE 150 MG/1
TABLET ORAL
Qty: 2 TABLET | Refills: 0 | Status: SHIPPED | OUTPATIENT
Start: 2024-12-31

## 2025-01-06 ENCOUNTER — OFFICE VISIT (OUTPATIENT)
Dept: INTERNAL MEDICINE | Facility: CLINIC | Age: 35
End: 2025-01-06
Payer: MEDICAID

## 2025-01-06 ENCOUNTER — PATIENT MESSAGE (OUTPATIENT)
Dept: INTERNAL MEDICINE | Facility: CLINIC | Age: 35
End: 2025-01-06

## 2025-01-06 VITALS
TEMPERATURE: 98.4 F | SYSTOLIC BLOOD PRESSURE: 130 MMHG | DIASTOLIC BLOOD PRESSURE: 76 MMHG | HEART RATE: 101 BPM | WEIGHT: 181 LBS | BODY MASS INDEX: 30.9 KG/M2 | HEIGHT: 64 IN | OXYGEN SATURATION: 98 %

## 2025-01-06 DIAGNOSIS — K92.1 BLACK STOOL: ICD-10-CM

## 2025-01-06 DIAGNOSIS — G89.29 CHRONIC NECK PAIN: Primary | ICD-10-CM

## 2025-01-06 DIAGNOSIS — E55.9 VITAMIN D DEFICIENCY: ICD-10-CM

## 2025-01-06 DIAGNOSIS — Z98.890 HISTORY OF CERVICAL SPINAL SURGERY: ICD-10-CM

## 2025-01-06 DIAGNOSIS — M54.89 OTHER CHRONIC BACK PAIN: ICD-10-CM

## 2025-01-06 DIAGNOSIS — G89.29 OTHER CHRONIC BACK PAIN: ICD-10-CM

## 2025-01-06 DIAGNOSIS — Z87.39 HISTORY OF SCOLIOSIS: ICD-10-CM

## 2025-01-06 DIAGNOSIS — F41.9 ANXIETY: ICD-10-CM

## 2025-01-06 DIAGNOSIS — M54.2 CHRONIC NECK PAIN: Primary | ICD-10-CM

## 2025-01-06 PROBLEM — M54.9 NOTALGIA: Status: ACTIVE | Noted: 2025-01-06

## 2025-01-06 PROCEDURE — 1159F MED LIST DOCD IN RCRD: CPT | Performed by: NURSE PRACTITIONER

## 2025-01-06 PROCEDURE — 99214 OFFICE O/P EST MOD 30 MIN: CPT | Performed by: NURSE PRACTITIONER

## 2025-01-06 PROCEDURE — 1125F AMNT PAIN NOTED PAIN PRSNT: CPT | Performed by: NURSE PRACTITIONER

## 2025-01-06 PROCEDURE — 1160F RVW MEDS BY RX/DR IN RCRD: CPT | Performed by: NURSE PRACTITIONER

## 2025-01-06 RX ORDER — PROPRANOLOL HYDROCHLORIDE 60 MG/1
60 CAPSULE, EXTENDED RELEASE ORAL DAILY
Qty: 30 CAPSULE | Refills: 2 | Status: SHIPPED | OUTPATIENT
Start: 2025-01-06

## 2025-01-06 RX ORDER — SENNOSIDES 8.6 MG
1300 CAPSULE ORAL EVERY 8 HOURS PRN
Qty: 180 TABLET | Refills: 5 | Status: SHIPPED | OUTPATIENT
Start: 2025-01-06

## 2025-01-06 RX ORDER — LIDOCAINE 50 MG/G
1 PATCH TOPICAL EVERY 24 HOURS
Qty: 30 PATCH | Refills: 5 | Status: SHIPPED | OUTPATIENT
Start: 2025-01-06

## 2025-01-06 RX ORDER — MULTIVIT-MIN/IRON/FOLIC ACID/K 18-600-40
2000 CAPSULE ORAL DAILY
Qty: 30 CAPSULE | Refills: 12 | Status: SHIPPED | OUTPATIENT
Start: 2025-01-06

## 2025-01-06 RX ORDER — METHOCARBAMOL 750 MG/1
750 TABLET, FILM COATED ORAL 4 TIMES DAILY PRN
Qty: 120 TABLET | Refills: 0 | Status: SHIPPED | OUTPATIENT
Start: 2025-01-06

## 2025-01-06 NOTE — PATIENT INSTRUCTIONS
Neck Exercises  Ask your health care provider which exercises are safe for you. Do exercises exactly as told by your health care provider and adjust them as directed. It is normal to feel mild stretching, pulling, tightness, or discomfort as you do these exercises. Stop right away if you feel sudden pain or your pain gets worse. Do not begin these exercises until told by your health care provider.  Neck exercises can be important for many reasons. They can improve strength and maintain flexibility in your neck, which will help your upper back and prevent neck pain.  Stretching exercises  Rotation neck stretching  Sit in a chair or stand up.  Place your feet flat on the floor, shoulder-width apart.  Slowly turn your head (rotate) to the right until a slight stretch is felt. Turn it all the way to the right so you can look over your right shoulder. Do not tilt or tip your head.  Hold this position for 10-30 seconds.  Slowly turn your head (rotate) to the left until a slight stretch is felt. Turn it all the way to the left so you can look over your left shoulder. Do not tilt or tip your head.  Hold this position for 10-30 seconds.  Repeat __________ times. Complete this exercise __________ times a day.  Neck retraction  Sit in a sturdy chair or stand up.  Look straight ahead. Do not bend your neck.  Use your fingers to push your chin backward (retraction). Do not bend your neck for this movement. Continue to face straight ahead. If you are doing the exercise properly, you will feel a slight sensation in your throat and a stretch at the back of your neck.  Hold the stretch for 1-2 seconds.  Repeat __________ times. Complete this exercise __________ times a day.  Strengthening exercises  Neck press  Lie on your back on a firm bed or on the floor with a pillow under your head.  Use your neck muscles to push your head down on the pillow and straighten your spine.  Hold the position as well as you can. Keep your head  facing up (in a neutral position) and your chin tucked.  Slowly count to 5 while holding this position.  Repeat __________ times. Complete this exercise __________ times a day.  Isometrics  These are exercises in which you strengthen the muscles in your neck while keeping your neck still (isometrics).  Sit in a supportive chair and place your hand on your forehead.  Keep your head and face facing straight ahead. Do not flex or extend your neck while doing isometrics.  Push forward with your head and neck while pushing back with your hand. Hold for 10 seconds.  Do the sequence again, this time putting your hand against the back of your head. Use your head and neck to push backward against the hand pressure.  Finally, do the same exercise on either side of your head, pushing sideways against the pressure of your hand.  Repeat __________ times. Complete this exercise __________ times a day.  Prone head lifts  Lie face-down (prone position), resting on your elbows so that your chest and upper back are raised.  Start with your head facing downward, near your chest. Position your chin either on or near your chest.  Slowly lift your head upward. Lift until you are looking straight ahead. Then continue lifting your head as far back as you can comfortably stretch.  Hold your head up for 5 seconds. Then slowly lower it to your starting position.  Repeat __________ times. Complete this exercise __________ times a day.  Supine head lifts  Lie on your back (supine position), bending your knees to point to the ceiling and keeping your feet flat on the floor.  Lift your head slowly off the floor, raising your chin toward your chest.  Hold for 5 seconds.  Repeat __________ times. Complete this exercise __________ times a day.  Scapular retraction  Stand with your arms at your sides. Look straight ahead.  Slowly pull both shoulders (scapulae) backward and downward (retraction) until you feel a stretch between your shoulder blades in  your upper back.  Hold for 10-30 seconds.  Relax and repeat.  Repeat __________ times. Complete this exercise __________ times a day.  Contact a health care provider if:  Your neck pain or discomfort gets worse when you do an exercise.  Your neck pain or discomfort does not improve within 2 hours after you exercise.  If you have any of these problems, stop exercising right away. Do not do the exercises again unless your health care provider says that you can.  Get help right away if:  You develop sudden, severe neck pain.  If this happens, stop exercising right away. Do not do the exercises again unless your health care provider says that you can.  This information is not intended to replace advice given to you by your health care provider. Make sure you discuss any questions you have with your health care provider.  Document Revised: 06/14/2022 Document Reviewed: 06/14/2022  [employer] Patient Education © 2024 [employer]           Low Back Sprain or Strain Rehab  Ask your health care provider which exercises are safe for you. Do exercises exactly as told by your health care provider and adjust them as directed. It is normal to feel mild stretching, pulling, tightness, or discomfort as you do these exercises. Stop right away if you feel sudden pain or your pain gets worse. Do not begin these exercises until told by your health care provider.  Stretching and range-of-motion exercises  These exercises warm up your muscles and joints and improve the movement and flexibility of your back. These exercises also help to relieve pain, numbness, and tingling.  Lumbar rotation  Lie on your back on a firm bed or the floor with your knees bent.  Straighten your arms out to your sides so each arm forms a 90-degree angle (right angle) with a side of your body.  Slowly move (rotate) both of your knees to one side of your body until you feel a stretch in your lower back (lumbar). Try not to let your shoulders lift off the  floor.  Hold this position for __________ seconds.  Tense your abdominal muscles and slowly move your knees back to the starting position.  Repeat this exercise on the other side of your body.  Repeat __________ times. Complete this exercise __________ times a day.  Single knee to chest  Lie on your back on a firm bed or the floor with both legs straight.  Bend one of your knees. Use your hands to move your knee up toward your chest until you feel a gentle stretch in your lower back and buttock.  Hold your leg in this position by holding on to the front of your knee.  Keep your other leg as straight as possible.  Hold this position for __________ seconds.  Slowly return to the starting position.  Repeat with your other leg.  Repeat __________ times. Complete this exercise __________ times a day.  Prone extension on elbows  Lie on your abdomen on a firm bed or the floor (prone position).  Prop yourself up on your elbows.  Use your arms to help lift your chest up until you feel a gentle stretch in your abdomen and your lower back.  This will place some of your body weight on your elbows. If this is uncomfortable, try stacking pillows under your chest.  Your hips should stay down, against the surface that you are lying on. Keep your hip and back muscles relaxed.  Hold this position for __________ seconds.  Slowly relax your upper body and return to the starting position.  Repeat __________ times. Complete this exercise __________ times a day.  Strengthening exercises  These exercises build strength and endurance in your back. Endurance is the ability to use your muscles for a long time, even after they get tired.  Pelvic tilt  This exercise strengthens the muscles that lie deep in the abdomen.  Lie on your back on a firm bed or the floor with your legs extended.  Bend your knees so they are pointing toward the ceiling and your feet are flat on the floor.  Tighten your lower abdominal muscles to press your lower back  against the floor. This motion will tilt your pelvis so your tailbone points up toward the ceiling instead of pointing to your feet or the floor.  To help with this exercise, you may place a small towel under your lower back and try to push your back into the towel.  Hold this position for __________ seconds.  Let your muscles relax completely before you repeat this exercise.  Repeat __________ times. Complete this exercise __________ times a day.  Alternating arm and leg raises  Get on your hands and knees on a firm surface. If you are on a hard floor, you may want to use padding, such as an exercise mat, to cushion your knees.  Line up your arms and legs. Your hands should be directly below your shoulders, and your knees should be directly below your hips.  Lift your left leg behind you. At the same time, raise your right arm and straighten it in front of you.  Do not lift your leg higher than your hip.  Do not lift your arm higher than your shoulder.  Keep your abdominal and back muscles tight.  Keep your hips facing the ground.  Do not arch your back.  Keep your balance carefully, and do not hold your breath.  Hold this position for __________ seconds.  Slowly return to the starting position.  Repeat with your right leg and your left arm.  Repeat __________ times. Complete this exercise __________ times a day.  Abdominal set with straight leg raise  Lie on your back on a firm bed or the floor.  Bend one of your knees and keep your other leg straight.  Tense your abdominal muscles and lift your straight leg up, 4-6 inches (10-15 cm) off the ground.  Keep your abdominal muscles tight and hold this position for __________ seconds.  Do not hold your breath.  Do not arch your back. Keep it flat against the ground.  Keep your abdominal muscles tense as you slowly lower your leg back to the starting position.  Repeat with your other leg.  Repeat __________ times. Complete this exercise __________ times a day.  Single  leg lower with bent knees  Lie on your back on a firm bed or the floor.  Tense your abdominal muscles and lift your feet off the floor, one foot at a time, so your knees and hips are bent in 90-degree angles (right angles).  Your knees should be over your hips and your lower legs should be parallel to the floor.  Keeping your abdominal muscles tense and your knee bent, slowly lower one of your legs so your toe touches the ground.  Lift your leg back up to return to the starting position.  Do not hold your breath.  Do not let your back arch. Keep your back flat against the ground.  Repeat with your other leg.  Repeat __________ times. Complete this exercise __________ times a day.  Posture and body mechanics  Good posture and healthy body mechanics can help to relieve stress in your body's tissues and joints. Body mechanics refers to the movements and positions of your body while you do your daily activities. Posture is part of body mechanics. Good posture means:  Your spine is in its natural S-curve position (neutral).  Your shoulders are pulled back slightly.  Your head is not tipped forward (neutral).  Follow these guidelines to improve your posture and body mechanics in your everyday activities.  Standing  When standing, keep your spine neutral and your feet about hip-width apart. Keep a slight bend in your knees. Your ears, shoulders, and hips should line up.  When you do a task in which you  one place for a long time, place one foot up on a stable object that is 2-4 inches (5-10 cm) high, such as a footstool. This helps keep your spine neutral.  Sitting  When sitting, keep your spine neutral and keep your feet flat on the floor. Use a footrest, if necessary, and keep your thighs parallel to the floor. Avoid rounding your shoulders, and avoid tilting your head forward.  When working at a desk or a computer, keep your desk at a height where your hands are slightly lower than your elbows. Slide your chair  under your desk so you are close enough to maintain good posture.  When working at a computer, place your monitor at a height where you are looking straight ahead and you do not have to tilt your head forward or downward to look at the screen.  Resting  When lying down and resting, avoid positions that are most painful for you.  If you have pain with activities such as sitting, bending, stooping, or squatting, lie in a position in which your body does not bend very much. For example, avoid curling up on your side with your arms and knees near your chest (fetal position).  If you have pain with activities such as standing for a long time or reaching with your arms, lie with your spine in a neutral position and bend your knees slightly. Try the following positions:  Lying on your side with a pillow between your knees.  Lying on your back with a pillow under your knees.  Lifting  When lifting objects, keep your feet at least shoulder-width apart and tighten your abdominal muscles.  Bend your knees and hips and keep your spine neutral. It is important to lift using the strength of your legs, not your back. Do not lock your knees straight out.  Always ask for help to lift heavy or awkward objects.  This information is not intended to replace advice given to you by your health care provider. Make sure you discuss any questions you have with your health care provider.  Document Revised: 03/07/2022 Document Reviewed: 03/07/2022  [employer] Patient Education © 2024 [employer]
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
## 2025-01-07 ENCOUNTER — CLINICAL SUPPORT (OUTPATIENT)
Dept: INTERNAL MEDICINE | Facility: CLINIC | Age: 35
End: 2025-01-07
Payer: MEDICAID

## 2025-01-07 ENCOUNTER — HOSPITAL ENCOUNTER (OUTPATIENT)
Dept: GENERAL RADIOLOGY | Facility: HOSPITAL | Age: 35
Discharge: HOME | End: 2025-01-07
Admitting: NURSE PRACTITIONER
Payer: MEDICAID

## 2025-01-07 DIAGNOSIS — Z51.81 MEDICATION MONITORING ENCOUNTER: Primary | ICD-10-CM

## 2025-01-07 PROCEDURE — 72040 X-RAY EXAM NECK SPINE 2-3 VW: CPT

## 2025-01-07 NOTE — PROGRESS NOTES
XR of cervical spine appears normal with previous fusion noted at C4-5 and C5-6. Please perform stretches that I have sent to you via [employer] daily until your appointment with [facility] (sometimes insurance makes you do exercises for 6 weeks before approving MRI imaging) and make sure to let them know that you have been doing these exercises when you go for appointment.

## 2025-01-07 NOTE — TELEPHONE ENCOUNTER
Does not need an appointment she just needs to tell  she is leaving a urine for me and have her sign controlled substance contract as well. Also let her know we wait until the results of the drug test before sending the prescription.

## 2025-01-08 LAB
ERYTHROCYTE [DISTWIDTH] IN BLOOD BY AUTOMATED COUNT: 11.8 % (ref 12.3–15.4)
HCT VFR BLD AUTO: 37.7 % (ref 34–46.6)
HGB BLD-MCNC: 12.7 G/DL (ref 12–15.9)
MCH RBC QN AUTO: 31.4 PG (ref 26.6–33)
MCHC RBC AUTO-ENTMCNC: 33.7 G/DL (ref 31.5–35.7)
MCV RBC AUTO: 93.1 FL (ref 79–97)
PLATELET # BLD AUTO: 306 10*3/MM3 (ref 140–450)
RBC # BLD AUTO: 4.05 10*6/MM3 (ref 3.77–5.28)
WBC # BLD AUTO: 8.57 10*3/MM3 (ref 3.4–10.8)

## 2025-01-10 LAB — DRUGS UR: NORMAL

## 2025-01-12 DIAGNOSIS — M54.89 OTHER CHRONIC BACK PAIN: ICD-10-CM

## 2025-01-12 DIAGNOSIS — G89.29 CHRONIC NECK PAIN: ICD-10-CM

## 2025-01-12 DIAGNOSIS — G89.29 OTHER CHRONIC BACK PAIN: ICD-10-CM

## 2025-01-12 DIAGNOSIS — Z98.890 HISTORY OF CERVICAL SPINAL SURGERY: Primary | ICD-10-CM

## 2025-01-12 DIAGNOSIS — M54.2 CHRONIC NECK PAIN: ICD-10-CM

## 2025-01-12 DIAGNOSIS — Z87.39 HISTORY OF SCOLIOSIS: ICD-10-CM

## 2025-01-12 RX ORDER — GABAPENTIN 100 MG/1
100 CAPSULE ORAL 3 TIMES DAILY
Qty: 90 CAPSULE | Refills: 2 | Status: SHIPPED | OUTPATIENT
Start: 2025-01-12

## 2025-01-13 NOTE — PROGRESS NOTES
UDS appropriate. Gabapentin 100 mg three times daily sent to [facility] pharmacy.

## 2025-01-27 RX ORDER — PROMETHAZINE HYDROCHLORIDE 25 MG/1
25 TABLET ORAL EVERY 6 HOURS PRN
Qty: 20 TABLET | Refills: 0 | Status: SHIPPED | OUTPATIENT
Start: 2025-01-27 | End: 2025-01-30

## 2025-01-27 RX ORDER — ONDANSETRON 4 MG/1
4 TABLET, ORALLY DISINTEGRATING ORAL EVERY 8 HOURS PRN
Qty: 20 TABLET | Refills: 0 | Status: SHIPPED | OUTPATIENT
Start: 2025-01-27

## 2025-02-04 ENCOUNTER — HOSPITAL ENCOUNTER (OUTPATIENT)
Dept: MRI IMAGING | Facility: HOSPITAL | Age: 35
Discharge: HOME | End: 2025-02-04
Payer: MEDICAID

## 2025-02-04 ENCOUNTER — HOSPITAL ENCOUNTER (OUTPATIENT)
Dept: SLEEP MEDICINE | Facility: HOSPITAL | Age: 35
Discharge: HOME | End: 2025-02-04
Admitting: NURSE PRACTITIONER
Payer: MEDICAID

## 2025-02-04 DIAGNOSIS — R56.9 SEIZURE-LIKE ACTIVITY: ICD-10-CM

## 2025-02-04 PROCEDURE — 70553 MRI BRAIN STEM W/O & W/DYE: CPT

## 2025-02-04 PROCEDURE — A9577 INJ MULTIHANCE: HCPCS | Performed by: NURSE PRACTITIONER

## 2025-02-04 PROCEDURE — 25510000002 GADOBENATE DIMEGLUMINE 529 MG/ML SOLUTION: Performed by: NURSE PRACTITIONER

## 2025-02-04 PROCEDURE — 95816 EEG AWAKE AND DROWSY: CPT

## 2025-02-04 RX ADMIN — GADOBENATE DIMEGLUMINE 15 ML: 529 INJECTION, SOLUTION INTRAVENOUS at 09:20

## 2025-02-15 ENCOUNTER — HOSPITAL ENCOUNTER (EMERGENCY)
Facility: HOSPITAL | Age: 35
Discharge: HOME | End: 2025-02-16
Attending: STUDENT IN AN ORGANIZED HEALTH CARE EDUCATION/TRAINING PROGRAM
Payer: MEDICAID

## 2025-02-15 VITALS
RESPIRATION RATE: 14 BRPM | DIASTOLIC BLOOD PRESSURE: 81 MMHG | TEMPERATURE: 97.6 F | BODY MASS INDEX: 31.92 KG/M2 | OXYGEN SATURATION: 98 % | SYSTOLIC BLOOD PRESSURE: 116 MMHG | HEART RATE: 91 BPM | WEIGHT: 187 LBS | HEIGHT: 64 IN

## 2025-02-15 DIAGNOSIS — R11.2 NAUSEA AND VOMITING, UNSPECIFIED VOMITING TYPE: ICD-10-CM

## 2025-02-15 DIAGNOSIS — K52.9 GASTROENTERITIS: Primary | ICD-10-CM

## 2025-02-15 LAB
ALBUMIN SERPL-MCNC: 4.6 G/DL (ref 3.5–5.2)
ALBUMIN/GLOB SERPL: 1.5 G/DL
ALP SERPL-CCNC: 85 U/L (ref 39–117)
ALT SERPL W P-5'-P-CCNC: 17 U/L (ref 1–33)
ANION GAP SERPL CALCULATED.3IONS-SCNC: 9.9 MMOL/L (ref 5–15)
AST SERPL-CCNC: 23 U/L (ref 1–32)
B-HCG UR QL: NEGATIVE
BASOPHILS # BLD AUTO: 0.06 10*3/MM3 (ref 0–0.2)
BASOPHILS NFR BLD AUTO: 0.5 % (ref 0–1.5)
BILIRUB SERPL-MCNC: 0.4 MG/DL (ref 0–1.2)
BILIRUB UR QL STRIP: NEGATIVE
BUN SERPL-MCNC: 6 MG/DL (ref 6–20)
BUN/CREAT SERPL: 8.7 (ref 7–25)
CALCIUM SPEC-SCNC: 9.2 MG/DL (ref 8.6–10.5)
CHLORIDE SERPL-SCNC: 101 MMOL/L (ref 98–107)
CLARITY UR: CLEAR
CO2 SERPL-SCNC: 27.1 MMOL/L (ref 22–29)
COLOR UR: YELLOW
CREAT SERPL-MCNC: 0.69 MG/DL (ref 0.57–1)
DEPRECATED RDW RBC AUTO: 40.3 FL (ref 37–54)
EGFRCR SERPLBLD CKD-EPI 2021: 115.5 ML/MIN/1.73
EOSINOPHIL # BLD AUTO: 0.31 10*3/MM3 (ref 0–0.4)
EOSINOPHIL NFR BLD AUTO: 2.7 % (ref 0.3–6.2)
ERYTHROCYTE [DISTWIDTH] IN BLOOD BY AUTOMATED COUNT: 12 % (ref 12.3–15.4)
FLUAV RNA RESP QL NAA+PROBE: NOT DETECTED
FLUBV RNA RESP QL NAA+PROBE: NOT DETECTED
GLOBULIN UR ELPH-MCNC: 3 GM/DL
GLUCOSE SERPL-MCNC: 122 MG/DL (ref 65–99)
GLUCOSE UR STRIP-MCNC: NEGATIVE MG/DL
HCT VFR BLD AUTO: 38.3 % (ref 34–46.6)
HGB BLD-MCNC: 13.1 G/DL (ref 12–15.9)
HGB UR QL STRIP.AUTO: NEGATIVE
HOLD SPECIMEN: NORMAL
HOLD SPECIMEN: NORMAL
IMM GRANULOCYTES # BLD AUTO: 0.03 10*3/MM3 (ref 0–0.05)
IMM GRANULOCYTES NFR BLD AUTO: 0.3 % (ref 0–0.5)
KETONES UR QL STRIP: NEGATIVE
LEUKOCYTE ESTERASE UR QL STRIP.AUTO: NEGATIVE
LIPASE SERPL-CCNC: 22 U/L (ref 13–60)
LYMPHOCYTES # BLD AUTO: 1.83 10*3/MM3 (ref 0.7–3.1)
LYMPHOCYTES NFR BLD AUTO: 16 % (ref 19.6–45.3)
MCH RBC QN AUTO: 31.2 PG (ref 26.6–33)
MCHC RBC AUTO-ENTMCNC: 34.2 G/DL (ref 31.5–35.7)
MCV RBC AUTO: 91.2 FL (ref 79–97)
MONOCYTES # BLD AUTO: 0.93 10*3/MM3 (ref 0.1–0.9)
MONOCYTES NFR BLD AUTO: 8.2 % (ref 5–12)
NEUTROPHILS NFR BLD AUTO: 72.3 % (ref 42.7–76)
NEUTROPHILS NFR BLD AUTO: 8.25 10*3/MM3 (ref 1.7–7)
NITRITE UR QL STRIP: NEGATIVE
NRBC BLD AUTO-RTO: 0 /100 WBC (ref 0–0.2)
PH UR STRIP.AUTO: 5.5 [PH] (ref 5–8)
PLATELET # BLD AUTO: 278 10*3/MM3 (ref 140–450)
PMV BLD AUTO: 9.4 FL (ref 6–12)
POTASSIUM SERPL-SCNC: 3.6 MMOL/L (ref 3.5–5.2)
PROT SERPL-MCNC: 7.6 G/DL (ref 6–8.5)
PROT UR QL STRIP: NEGATIVE
RBC # BLD AUTO: 4.2 10*6/MM3 (ref 3.77–5.28)
SARS-COV-2 RNA RESP QL NAA+PROBE: NOT DETECTED
SODIUM SERPL-SCNC: 138 MMOL/L (ref 136–145)
SP GR UR STRIP: 1.01 (ref 1–1.03)
UROBILINOGEN UR QL STRIP: NORMAL
WBC NRBC COR # BLD AUTO: 11.41 10*3/MM3 (ref 3.4–10.8)
WHOLE BLOOD HOLD COAG: NORMAL
WHOLE BLOOD HOLD SPECIMEN: NORMAL

## 2025-02-15 PROCEDURE — 96374 THER/PROPH/DIAG INJ IV PUSH: CPT

## 2025-02-15 PROCEDURE — 80053 COMPREHEN METABOLIC PANEL: CPT | Performed by: STUDENT IN AN ORGANIZED HEALTH CARE EDUCATION/TRAINING PROGRAM

## 2025-02-15 PROCEDURE — 25810000003 LACTATED RINGERS SOLUTION: Performed by: STUDENT IN AN ORGANIZED HEALTH CARE EDUCATION/TRAINING PROGRAM

## 2025-02-15 PROCEDURE — 83690 ASSAY OF LIPASE: CPT | Performed by: STUDENT IN AN ORGANIZED HEALTH CARE EDUCATION/TRAINING PROGRAM

## 2025-02-15 PROCEDURE — 81025 URINE PREGNANCY TEST: CPT | Performed by: STUDENT IN AN ORGANIZED HEALTH CARE EDUCATION/TRAINING PROGRAM

## 2025-02-15 PROCEDURE — 96375 TX/PRO/DX INJ NEW DRUG ADDON: CPT

## 2025-02-15 PROCEDURE — 25010000002 PROCHLORPERAZINE 10 MG/2ML SOLUTION: Performed by: STUDENT IN AN ORGANIZED HEALTH CARE EDUCATION/TRAINING PROGRAM

## 2025-02-15 PROCEDURE — 99283 EMERGENCY DEPT VISIT LOW MDM: CPT | Performed by: STUDENT IN AN ORGANIZED HEALTH CARE EDUCATION/TRAINING PROGRAM

## 2025-02-15 PROCEDURE — 87636 SARSCOV2 & INF A&B AMP PRB: CPT | Performed by: STUDENT IN AN ORGANIZED HEALTH CARE EDUCATION/TRAINING PROGRAM

## 2025-02-15 PROCEDURE — 85025 COMPLETE CBC W/AUTO DIFF WBC: CPT | Performed by: STUDENT IN AN ORGANIZED HEALTH CARE EDUCATION/TRAINING PROGRAM

## 2025-02-15 PROCEDURE — 25010000002 ONDANSETRON PER 1 MG: Performed by: STUDENT IN AN ORGANIZED HEALTH CARE EDUCATION/TRAINING PROGRAM

## 2025-02-15 PROCEDURE — 81003 URINALYSIS AUTO W/O SCOPE: CPT | Performed by: STUDENT IN AN ORGANIZED HEALTH CARE EDUCATION/TRAINING PROGRAM

## 2025-02-15 RX ORDER — PROCHLORPERAZINE EDISYLATE 5 MG/ML
10 INJECTION INTRAMUSCULAR; INTRAVENOUS ONCE
Status: COMPLETED | OUTPATIENT
Start: 2025-02-15 | End: 2025-02-15

## 2025-02-15 RX ORDER — ONDANSETRON 2 MG/ML
4 INJECTION INTRAMUSCULAR; INTRAVENOUS ONCE
Status: COMPLETED | OUTPATIENT
Start: 2025-02-15 | End: 2025-02-15

## 2025-02-15 RX ORDER — SODIUM CHLORIDE 0.9 % (FLUSH) 0.9 %
10 SYRINGE (ML) INJECTION AS NEEDED
Status: DISCONTINUED | OUTPATIENT
Start: 2025-02-15 | End: 2025-02-16 | Stop reason: HOSPADM

## 2025-02-15 RX ORDER — ONDANSETRON 4 MG/1
4 TABLET, ORALLY DISINTEGRATING ORAL EVERY 8 HOURS PRN
Qty: 12 TABLET | Refills: 0 | Status: SHIPPED | OUTPATIENT
Start: 2025-02-15

## 2025-02-15 RX ORDER — PROMETHAZINE HYDROCHLORIDE 12.5 MG/1
12.5 TABLET ORAL EVERY 6 HOURS PRN
Qty: 15 TABLET | Refills: 0 | Status: SHIPPED | OUTPATIENT
Start: 2025-02-15

## 2025-02-15 RX ADMIN — ONDANSETRON 4 MG: 2 INJECTION INTRAMUSCULAR; INTRAVENOUS at 22:48

## 2025-02-15 RX ADMIN — SODIUM CHLORIDE, SODIUM LACTATE, POTASSIUM CHLORIDE, CALCIUM CHLORIDE 1000 ML: 20; 30; 600; 310 INJECTION, SOLUTION INTRAVENOUS at 22:48

## 2025-02-15 RX ADMIN — PROCHLORPERAZINE EDISYLATE 10 MG: 5 INJECTION INTRAMUSCULAR; INTRAVENOUS at 23:44

## 2025-02-15 NOTE — Clinical Note
[facility] EMERGENCY DEPARTMENT  [address] [address]  Phone: [phone]    Mckayla Turpin was seen and treated in our emergency department on 2/15/2025.  She may return to work on 02/17/2025.  Please excuse from work on 2/15/2025 and 2/16/2025       Thank you for choosing [facility].    William Barnes MD

## 2025-02-15 NOTE — Clinical Note
[facility] EMERGENCY DEPARTMENT  [address] [address]  Phone: [phone]    Mckayla Turpin was seen and treated in our emergency department on 2/15/2025.  She may return to work on 02/17/2025.  Please excuse from work on 2/15/2025 and 2/16/2025       Thank you for choosing [facility].    William Barnes MD

## 2025-02-16 NOTE — DISCHARGE INSTRUCTIONS
Take Zofran or Phenergan as needed for nausea and vomiting at home.  Continue to drink plenty of fluids to prevent dehydration.  Follow-up closely with primary care provider.  Return emergency department symptoms do not improve despite treatment.

## 2025-02-16 NOTE — ED PROVIDER NOTES
[facility] EMERGENCY DEPARTMENT  Emergency Department Encounter  Emergency Medicine Physician Note       Pt Name: Mckayla Turpin  MRN: [id number]  Pt :   1990  Room Number:  14  Date of encounter:  2/15/2025  PCP: Irma Brooks, YOVANA  ED Provider: William Barnes MD    Historian: Patient      HPI:  Chief Complaint: Vomiting        Context: Mckayla Turpin is a 35 y.o. female who presents to the ED c/o vomiting.  Patient states since around 10 PM last night she has had issues with vomiting and nausea.  Also reports some cramping in her abdomen but with no focal pain.  Denies fever.  Did not have any medications to take for it at home.  Denies diarrhea.  No other symptoms reported at this time.      PAST MEDICAL HISTORY  Past Medical History:   Diagnosis Date    Allergic     Anxiety     Cholelithiasis     Depression     Full dentures     Headache     Herniated disc, cervical     Low back pain     Migraine     Very severe    PONV (postoperative nausea and vomiting)     Seizures     Sleep apnea     Spinal column deformity     Tachycardia          PAST SURGICAL HISTORY  Past Surgical History:   Procedure Laterality Date    ANTERIOR CERVICAL DISCECTOMY W/ FUSION      hardware implanted    APPENDECTOMY  2022    CHOLECYSTECTOMY  2017    COLONOSCOPY      COLONOSCOPY N/A 12/10/2024    Procedure: Colonoscopy with biopsy;  Surgeon: Jace Whittaker MD;  Location: [facility] ENDOSCOPY;  Service: Gastroenterology;  Laterality: N/A;    ENDOMETRIAL ABLATION      ENDOSCOPY N/A 12/10/2024    Procedure: Esophagogastroduodenoscopy with biopsy;  Surgeon: Jace Whittaker MD;  Location: [facility] ENDOSCOPY;  Service: Gastroenterology;  Laterality: N/A;    EYE SURGERY Left     x 24    OTHER SURGICAL HISTORY      LOOP recorder implant    TONSILECTOMY, ADENOIDECTOMY, BILATERAL MYRINGOTOMY AND TUBES      TUBAL ABDOMINAL LIGATION      UMBILICAL HERNIA REPAIR           FAMILY HISTORY  Family  History   Problem Relation Age of Onset    Hypertension Mother     Diabetes Mother     Cancer Mother     Neuropathy Mother     Arthritis Mother         RA    Hyperlipidemia Mother     Hyperlipidemia Father     Hypertension Father     Depression Brother     Anxiety disorder Brother     Pancreatitis Maternal Aunt     Seizures Maternal Aunt     Seizures Maternal Aunt     Seizures Maternal Aunt     Stroke Maternal Grandmother     Seizures Maternal Grandmother     Colon cancer Maternal Grandfather     Colon polyps Maternal Grandfather     Thyroid disease Paternal Grandmother     Hearing loss Paternal Uncle         Deaf at Birth         SOCIAL HISTORY  Social History     Socioeconomic History    Marital status:    Tobacco Use    Smoking status: Never    Smokeless tobacco: Never   Vaping Use    Vaping status: Never Used   Substance and Sexual Activity    Alcohol use: Never    Drug use: Never    Sexual activity: Yes     Partners: Male     Birth control/protection: Tubal ligation     Comment: Endometrial Ablation         ALLERGIES  Vancomycin and Sulfa antibiotics        REVIEW OF SYSTEMS  Review of Systems     All systems reviewed and negative except for those discussed in HPI.       PHYSICAL EXAM    I have reviewed the triage vital signs and nursing notes.    ED Triage Vitals [02/15/25 2216]   Temp Heart Rate Resp BP SpO2   97.6 °F (36.4 °C) 91 14 117/82 99 %      Temp src Heart Rate Source Patient Position BP Location FiO2 (%)   Oral Monitor Sitting Left arm --       Physical Exam    General:  Awake, alert, no acute distress  HEENT: Atraumatic, normocephalic, EOMI, PERRLA, mucous membranes moist  NECK:  Supple, atraumatic  Cardiovascular:  Regular rate, regular rhythm, no murmurs, rubs, or gallops.  Extremities well perfused   Respiratory:  Regular rate, clear lungs to auscultation bilaterally.  No rhonchi, rales, wheezing  Abdominal:  Soft, nondistended, nontender.  No guarding or rebound.  No palpable  masses  Extremity:  No visible bony abnormalities in all 4 extremities.  Full range of motion of all extremities.  Skin:  Warm and dry.  No rashes  Neuro:  AAOx3, GCS 15. Cranial nerves 2-12 grossly intact.  No focal strength or sensation deficits.  Psych:  Mood and affect appropriate.        LAB RESULTS  Recent Results (from the past 24 hours)   Comprehensive Metabolic Panel    Collection Time: 02/15/25 10:31 PM    Specimen: Blood   Result Value Ref Range    Glucose 122 (H) 65 - 99 mg/dL    BUN 6 6 - 20 mg/dL    Creatinine 0.69 0.57 - 1.00 mg/dL    Sodium 138 136 - 145 mmol/L    Potassium 3.6 3.5 - 5.2 mmol/L    Chloride 101 98 - 107 mmol/L    CO2 27.1 22.0 - 29.0 mmol/L    Calcium 9.2 8.6 - 10.5 mg/dL    Total Protein 7.6 6.0 - 8.5 g/dL    Albumin 4.6 3.5 - 5.2 g/dL    ALT (SGPT) 17 1 - 33 U/L    AST (SGOT) 23 1 - 32 U/L    Alkaline Phosphatase 85 39 - 117 U/L    Total Bilirubin 0.4 0.0 - 1.2 mg/dL    Globulin 3.0 gm/dL    A/G Ratio 1.5 g/dL    BUN/Creatinine Ratio 8.7 7.0 - 25.0    Anion Gap 9.9 5.0 - 15.0 mmol/L    eGFR 115.5 >60.0 mL/min/1.73   Lipase    Collection Time: 02/15/25 10:31 PM    Specimen: Blood   Result Value Ref Range    Lipase 22 13 - 60 U/L   Green Top (Gel)    Collection Time: 02/15/25 10:31 PM   Result Value Ref Range    Extra Tube Hold for add-ons.    Lavender Top    Collection Time: 02/15/25 10:31 PM   Result Value Ref Range    Extra Tube hold for add-on    Gold Top - SST    Collection Time: 02/15/25 10:31 PM   Result Value Ref Range    Extra Tube Hold for add-ons.    Light Blue Top    Collection Time: 02/15/25 10:31 PM   Result Value Ref Range    Extra Tube Hold for add-ons.    CBC Auto Differential    Collection Time: 02/15/25 10:31 PM    Specimen: Blood   Result Value Ref Range    WBC 11.41 (H) 3.40 - 10.80 10*3/mm3    RBC 4.20 3.77 - 5.28 10*6/mm3    Hemoglobin 13.1 12.0 - 15.9 g/dL    Hematocrit 38.3 34.0 - 46.6 %    MCV 91.2 79.0 - 97.0 fL    MCH 31.2 26.6 - 33.0 pg    MCHC 34.2 31.5 -  35.7 g/dL    RDW 12.0 (L) 12.3 - 15.4 %    RDW-SD 40.3 37.0 - 54.0 fl    MPV 9.4 6.0 - 12.0 fL    Platelets 278 140 - 450 10*3/mm3    Neutrophil % 72.3 42.7 - 76.0 %    Lymphocyte % 16.0 (L) 19.6 - 45.3 %    Monocyte % 8.2 5.0 - 12.0 %    Eosinophil % 2.7 0.3 - 6.2 %    Basophil % 0.5 0.0 - 1.5 %    Immature Grans % 0.3 0.0 - 0.5 %    Neutrophils, Absolute 8.25 (H) 1.70 - 7.00 10*3/mm3    Lymphocytes, Absolute 1.83 0.70 - 3.10 10*3/mm3    Monocytes, Absolute 0.93 (H) 0.10 - 0.90 10*3/mm3    Eosinophils, Absolute 0.31 0.00 - 0.40 10*3/mm3    Basophils, Absolute 0.06 0.00 - 0.20 10*3/mm3    Immature Grans, Absolute 0.03 0.00 - 0.05 10*3/mm3    nRBC 0.0 0.0 - 0.2 /100 WBC   COVID-19 and FLU A/B PCR, 1 HR TAT - Swab, Nasopharynx    Collection Time: 02/15/25 10:42 PM    Specimen: Nasopharynx; Swab   Result Value Ref Range    COVID19 Not Detected Not Detected - Ref. Range    Influenza A PCR Not Detected Not Detected    Influenza B PCR Not Detected Not Detected   Urinalysis With Microscopic If Indicated (No Culture) - Urine, Clean Catch    Collection Time: 02/15/25 11:24 PM    Specimen: Urine, Clean Catch   Result Value Ref Range    Color, UA Yellow Yellow, Straw    Appearance, UA Clear Clear    pH, UA 5.5 5.0 - 8.0    Specific Gravity, UA 1.014 1.005 - 1.030    Glucose, UA Negative Negative    Ketones, UA Negative Negative    Bilirubin, UA Negative Negative    Blood, UA Negative Negative    Protein, UA Negative Negative    Leuk Esterase, UA Negative Negative    Nitrite, UA Negative Negative    Urobilinogen, UA 1.0 E.U./dL 0.2 - 1.0 E.U./dL   Pregnancy, Urine - Urine, Clean Catch    Collection Time: 02/15/25 11:24 PM    Specimen: Urine, Clean Catch   Result Value Ref Range    HCG, Urine QL Negative Negative             RADIOLOGY  No Radiology Exams Resulted Within Past 24 Hours        PROCEDURES    Procedures    No orders to display       MEDICATIONS GIVEN IN ER    Medications   sodium chloride 0.9 % flush 10 mL (has no  administration in time range)   ondansetron (ZOFRAN) injection 4 mg (4 mg Intravenous Given 2/15/25 2248)   lactated ringers bolus 1,000 mL (0 mL Intravenous Stopped 2/16/25 0010)   prochlorperazine (COMPAZINE) injection 10 mg (10 mg Intravenous Given 2/15/25 2344)         MEDICAL DECISION MAKING, PROGRESS, and CONSULTS    All labs, if obtained, have been independently reviewed by me.  All radiology studies, if obtained, have been evaluated by me and the radiologist dictating the report.  All EKG's, if obtained, have been independently viewed and interpreted by me.      Discussion below represents my analysis of pertinent findings related to patient's condition, differential diagnosis, treatment plan and final disposition.    Mckayla Turpin is a 35 y.o. female who presents to the ED c/o vomiting.  Hemodynamically stable and nontoxic in appearance on arrival.  Differential includes but not limited to viral gastroenteritis, food poisoning, pancreatitis, less likely bowel obstruction.  Labs obtained including CBC, CMP, lipase, urinalysis, pregnancy test, COVID and influenza.  Patient given IV Zofran along with 1 L bolus of lactated ringer IV fluids.    Labs personally interpreted showing mild leukocytosis 11.4.  Normal electrolyte panel and LFTs.  Normal lipase.  Urinalysis negative.  Pregnancy test negative.  COVID and influenza negative.  Still having some nausea following Zofran so was given IV Compazine.      Continued improvement and now tolerating p.o. intake on repeat assessment.  Provided with prescription for Zofran and Phenergan to continue as an outpatient as needed for nausea vomiting.  Advised on strict return precautions and the importance following up with primary care provider.  Patient agreeable with this plan and was discharged.                                 Orders placed during this visit:  Orders Placed This Encounter   Procedures    COVID-19 and FLU A/B PCR, 1 HR TAT - Swab, Nasopharynx     [place] Draw    Comprehensive Metabolic Panel    Lipase    Urinalysis With Microscopic If Indicated (No Culture) - Urine, Clean Catch    Pregnancy, Urine - Urine, Clean Catch    CBC Auto Differential    NPO Diet NPO Type: Strict NPO    Undress & Gown    Insert Peripheral IV    CBC & Differential    Green Top (Gel)    Lavender Top    Gold Top - SST    Light Blue Top         ED Course:    Consultants:  none                  Shared Decision Making:  After my consideration of clinical presentation and any laboratory/radiology studies obtained, I discussed the findings with the patient/patient representative who is in agreement with the treatment plan and the final disposition.   Risks and benefits of discharge and/or observation/admission were discussed.      AS OF 01:28 EST VITALS:    BP - 116/81  HR - 91  TEMP - 97.6 °F (36.4 °C) (Oral)  O2 SATS - 98%                  DIAGNOSIS  Final diagnoses:   Gastroenteritis   Nausea and vomiting, unspecified vomiting type         DISPOSITION  Discharge      Please note that portions of this document were completed with voice recognition software.        William Barnes MD  02/16/25 0128

## 2025-03-04 DIAGNOSIS — F41.9 ANXIETY: ICD-10-CM

## 2025-03-04 RX ORDER — PROPRANOLOL HYDROCHLORIDE 60 MG/1
CAPSULE, EXTENDED RELEASE ORAL
Qty: 90 CAPSULE | Refills: 0 | Status: SHIPPED | OUTPATIENT
Start: 2025-03-04

## 2025-03-04 RX ORDER — ERGOCALCIFEROL 1.25 MG/1
50000 CAPSULE, LIQUID FILLED ORAL WEEKLY
Qty: 12 CAPSULE | Refills: 0 | OUTPATIENT
Start: 2025-03-04

## 2025-03-07 ENCOUNTER — TELEPHONE (OUTPATIENT)
Dept: GASTROENTEROLOGY | Facility: CLINIC | Age: 35
End: 2025-03-07
Payer: MEDICAID

## 2025-03-07 NOTE — TELEPHONE ENCOUNTER
Called patient to follow-up on missed appt; LVM.  If patient returns call, okay for Hub to R/S appt.

## 2025-05-30 ENCOUNTER — PATIENT MESSAGE (OUTPATIENT)
Dept: INTERNAL MEDICINE | Facility: CLINIC | Age: 35
End: 2025-05-30

## 2025-05-30 DIAGNOSIS — F51.04 PSYCHOPHYSIOLOGICAL INSOMNIA: ICD-10-CM

## 2025-05-30 DIAGNOSIS — F32.A ANXIETY AND DEPRESSION: ICD-10-CM

## 2025-05-30 DIAGNOSIS — F41.9 ANXIETY AND DEPRESSION: ICD-10-CM

## 2025-05-30 RX ORDER — CALCIUM CARBONATE 300MG(750)
1 TABLET,CHEWABLE ORAL NIGHTLY
Qty: 90 TABLET | Refills: 0 | Status: SHIPPED | OUTPATIENT
Start: 2025-05-30

## 2025-05-30 RX ORDER — AMITRIPTYLINE HYDROCHLORIDE 50 MG/1
50 TABLET ORAL NIGHTLY PRN
Qty: 90 TABLET | Refills: 0 | Status: SHIPPED | OUTPATIENT
Start: 2025-05-30

## 2025-07-20 DIAGNOSIS — G89.29 CHRONIC NECK PAIN: ICD-10-CM

## 2025-07-20 DIAGNOSIS — Z98.890 HISTORY OF CERVICAL SPINAL SURGERY: ICD-10-CM

## 2025-07-20 DIAGNOSIS — M54.89 OTHER CHRONIC BACK PAIN: ICD-10-CM

## 2025-07-20 DIAGNOSIS — M54.2 CHRONIC NECK PAIN: ICD-10-CM

## 2025-07-20 DIAGNOSIS — Z87.39 HISTORY OF SCOLIOSIS: ICD-10-CM

## 2025-07-20 DIAGNOSIS — G89.29 OTHER CHRONIC BACK PAIN: ICD-10-CM

## 2025-07-21 RX ORDER — GABAPENTIN 100 MG/1
100 CAPSULE ORAL 3 TIMES DAILY
Qty: 90 CAPSULE | Refills: 0 | Status: SHIPPED | OUTPATIENT
Start: 2025-07-21

## 2025-07-21 NOTE — TELEPHONE ENCOUNTER
Rx Refill Note  Requested Prescriptions     Pending Prescriptions Disp Refills    gabapentin (NEURONTIN) 100 MG capsule [Pharmacy Med Name: Gabapentin 100 MG Oral Capsule] 90 capsule 0     Sig: TAKE 1 CAPSULE BY MOUTH THREE TIMES DAILY      Last office visit with prescribing clinician: 1/6/2025   Last telemedicine visit with prescribing clinician: Visit date not found   Next office visit with prescribing clinician: Visit date not found.  03/4/2025- NO SHOW      Abiel Bond MA  07/21/25, 11:50 EDT

## (undated) DEVICE — CONMED SCOPE SAVER BITE BLOCK, 20X27 MM: Brand: SCOPE SAVER

## (undated) DEVICE — VLV SXN AIR/H2O ORCAPOD3 1P/U STRL

## (undated) DEVICE — ENDOSCOPY PORT CONNECTOR FOR OLYMPUS® SCOPES: Brand: ERBE

## (undated) DEVICE — LUBE JELLY PK/2.75GM STRL BX/144

## (undated) DEVICE — HYBRID CO2 TUBING/CAP SET FOR OLYMPUS® SCOPES & CO2 SOURCE: Brand: ERBE

## (undated) DEVICE — FRCP BX RADJAW4 NDL 2.8 240 STD OG

## (undated) DEVICE — Device